# Patient Record
Sex: FEMALE | Race: WHITE | Employment: FULL TIME | URBAN - NONMETROPOLITAN AREA
[De-identification: names, ages, dates, MRNs, and addresses within clinical notes are randomized per-mention and may not be internally consistent; named-entity substitution may affect disease eponyms.]

---

## 2017-05-12 ENCOUNTER — OFFICE VISIT (OUTPATIENT)
Dept: FAMILY MEDICINE | Facility: CLINIC | Age: 37
End: 2017-05-12
Payer: COMMERCIAL

## 2017-05-12 VITALS
SYSTOLIC BLOOD PRESSURE: 132 MMHG | OXYGEN SATURATION: 99 % | HEART RATE: 80 BPM | TEMPERATURE: 97.3 F | DIASTOLIC BLOOD PRESSURE: 72 MMHG

## 2017-05-12 DIAGNOSIS — M71.9 DISORDER OF BURSAE AND TENDONS IN SHOULDER REGION: Primary | ICD-10-CM

## 2017-05-12 DIAGNOSIS — M67.919 DISORDER OF BURSAE AND TENDONS IN SHOULDER REGION: Primary | ICD-10-CM

## 2017-05-12 PROCEDURE — 99213 OFFICE O/P EST LOW 20 MIN: CPT | Mod: 25 | Performed by: NURSE PRACTITIONER

## 2017-05-12 PROCEDURE — 20610 DRAIN/INJ JOINT/BURSA W/O US: CPT | Mod: RT | Performed by: NURSE PRACTITIONER

## 2017-05-12 RX ORDER — TRIAMCINOLONE ACETONIDE 40 MG/ML
40 INJECTION, SUSPENSION INTRA-ARTICULAR; INTRAMUSCULAR ONCE
Qty: 1 ML | Refills: 0 | OUTPATIENT
Start: 2017-05-12 | End: 2017-05-12

## 2017-05-12 RX ORDER — LIDOCAINE HYDROCHLORIDE 10 MG/ML
3 INJECTION, SOLUTION INFILTRATION; PERINEURAL ONCE
Qty: 3 ML | Refills: 0 | OUTPATIENT
Start: 2017-05-12 | End: 2017-05-12

## 2017-05-12 NOTE — PATIENT INSTRUCTIONS
Shoulder Impingement Syndrome  The rotator cuff is a group of muscles and tendons that surround the shoulder joint. These muscles and tendons hold the arm in its joint. They help the shoulder turn. The rotator cuff muscles and tendons can become irritated from repeated rubbing against the shoulder bone. This is called shoulder impingement syndrome or rotator cuff tendonitis.    If your case is mild, you may only need to rest the shoulder and then do certain exercises to strengthen the muscles. You can also take anti-inflammatory medicines. Steroid injections into the shoulder can ease inflammation. But you can have only a limited number of these. If the condition gets worse, your shoulder muscles may become thin and weak. This can lead to a rotator cuff tear.  Symptoms of shoulder impingement syndrome may include:    Shoulder pain that gets worse when you raise your arm overhead    Weakness of the shoulder muscles when you use your arm overhead    Popping and clicking when you move your shoulder    Shoulder pain that wakes you up at night when you sleep on the affected shoulder    Sudden pain in your shoulder when you lift or reach up  Home Care  Follow these tips to take care of yourself at home:    Avoid activities that make your pain worse. These include raising your arms overhead, repeating the same motion over and over, or lifting heavy objects.    Don t hold your arm in one position for a long time. Keep it moving.    Put an ice pack on the sore area for 20 minutes every 1 to 2 hours for the first day. You can make an ice pack by putting ice cubes in a plastic bag. Wrap the bag in a towel before putting it on your shoulder. You should use the ice packs 3 to 4 times a day for the next 2 days. Continue using the ice to relieve of pain and swelling.    You may take acetaminophen or ibuprofen to control pain, unless another medicine was prescribed. If prednisone was prescribed, don t take ibuprofen-type  medicines. If you have chronic liver or kidney disease or ever had a stomach ulcer or GI bleeding, talk with your doctor before using these medicines.    After your symptoms ease, you may get physical therapy or start a home exercise program. This can strengthen your shoulder muscles and help your range of motion. Talk with your doctor about what is best for your condition.  Follow-up care  Follow up with your doctor.  When to seek medical care  Get prompt medical attention if any of these occur:    Shoulder pain that gets worse and wakes you up at night    Your shoulder or arm swells    Numbness, tingling, or pain that travels down the arm to the hand    Loss of shoulder strength       4961-9254 [employer]. [address] [place] [address], [place] [address]. All rights reserved. This information is not intended as a substitute for professional medical care. Always follow your healthcare professional's instructions.

## 2017-05-12 NOTE — PROGRESS NOTES
SUBJECTIVE:                                                    Shweta Pike is a 36 year old female who presents to clinic today for the following health issues:      Musculoskeletal problem/pain      Duration: months     Description  Location: right shoulder > 10 yrs    Intensity:  moderate    Accompanying signs and symptoms: none    History  Previous similar problem: YES- injection in the past   Previous evaluation:  x-ray and MRI    Precipitating or alleviating factors:  Trauma or overuse: no   Aggravating factors include: overuse    Therapies tried and outcome: rest/inactivity and stretching    Previously seen by orthopedic specialist. Previously seen by orthopedic surgeon. No surgical intervention indicated at this time per patient report.    Previous MRI and x-ray are reviewed. See chart.     Results for orders placed or performed in visit on 08/15/16   XR Shoulder Right G/E 3 Views    Narrative    RIGHT SHOULDER 4 VIEWS  8/15/2016 11:31 AM    HISTORY:  Right shoulder pain.    COMPARISON:  3/13/2014    FINDINGS:  No fracture or osseous lesion is seen. The joint spaces are  well preserved. No adjacent soft tissue pathology is seen.      Impression    IMPRESSION:  Unremarkable examination. I see no definite change since  the previous examination.     LITA BROCK MD         -------------------------------------    Problem list and histories reviewed & adjusted, as indicated.  Additional history: as documented    BP Readings from Last 3 Encounters:   05/12/17 132/72   08/15/16 114/83   07/26/16 98/60    Wt Readings from Last 3 Encounters:   08/15/16 155 lb (70.3 kg)   07/26/16 155 lb (70.3 kg)   06/21/16 153 lb (69.4 kg)                  Labs reviewed in EPIC    Reviewed and updated as needed this visit by clinical staff  Allergies       Reviewed and updated as needed this visit by Provider         ROS:  C: NEGATIVE for fever, chills, change in weight  E/M: NEGATIVE for ear, mouth and throat  problems  R: NEGATIVE for significant cough or SOB  CV: NEGATIVE for chest pain, palpitations or peripheral edema  MUSCULOSKELETAL: right shoulder pain  ROS otherwise negative    OBJECTIVE:                                                    /72  Pulse 80  Temp 97.3  F (36.3  C) (Tympanic)  SpO2 99%  There is no height or weight on file to calculate BMI.   GENERAL: healthy, alert, well nourished, well hydrated, no distress  HENT: ear canals- normal; TMs- normal; Nose- normal; Mouth- no ulcers, no lesions  NECK: no tenderness, no adenopathy, no asymmetry, no masses, no stiffness; thyroid- normal to palpation  RESP: lungs clear to auscultation - no rales, no rhonchi, no wheezes  CV: regular rates and rhythm, normal S1 S2, no S3 or S4 and no murmur, no click or rub -  ABDOMEN: soft, no tenderness, no  hepatosplenomegaly, no masses, normal bowel sounds  MUSC: limited range of motion to the right shoulder due to pain. Pain with palpation over the anterior and posterior shoulder. Grasps are equal and strong. CMS to the right hand intact.Pulses are 2+.  SKIN: no suspicious lesions, no rashes    Diagnostic test results:  Results for orders placed or performed in visit on 08/15/16   XR Shoulder Right G/E 3 Views    Narrative    RIGHT SHOULDER 4 VIEWS  8/15/2016 11:31 AM    HISTORY:  Right shoulder pain.    COMPARISON:  3/13/2014    FINDINGS:  No fracture or osseous lesion is seen. The joint spaces are  well preserved. No adjacent soft tissue pathology is seen.      Impression    IMPRESSION:  Unremarkable examination. I see no definite change since  the previous examination.     LITA BROCK MD        ASSESSMENT/PLAN:                                                    1. Disorder of bursae and tendons in shoulder region  fter verbal consent was obtained. Using sterile technique.  A steroid injection was performed right posterior shoulder using 1% plain Lidocaine and 40 mg of Kenalog. This was well  tolerated.  Rest, ice, compress, elevate as needed for pain.      Follow up with Provider -   Call or return to the clinic with any worsening of symptoms or no resolution. Patient/Parent verbalized understanding and is in agreement. Medication side effects reviewed.   Current Outpatient Prescriptions   Medication Sig Dispense Refill     IBUPROFEN PO Take 800 mg by mouth as needed for moderate pain       traZODone (DESYREL) 100 MG tablet Take 2 tablets (200 mg) by mouth At Bedtime 180 tablet 1       See Patient Instructions    ORQUIDEA Aleman [facility]

## 2017-05-12 NOTE — MR AVS SNAPSHOT
After Visit Summary   5/12/2017    Shweta Pike    MRN: [id number]           Patient Information     Date Of Birth          1980        Visit Information        Provider Department      5/12/2017 10:00 AM Nahed Olson APRN [facility]        Care Instructions      Shoulder Impingement Syndrome  The rotator cuff is a group of muscles and tendons that surround the shoulder joint. These muscles and tendons hold the arm in its joint. They help the shoulder turn. The rotator cuff muscles and tendons can become irritated from repeated rubbing against the shoulder bone. This is called shoulder impingement syndrome or rotator cuff tendonitis.    If your case is mild, you may only need to rest the shoulder and then do certain exercises to strengthen the muscles. You can also take anti-inflammatory medicines. Steroid injections into the shoulder can ease inflammation. But you can have only a limited number of these. If the condition gets worse, your shoulder muscles may become thin and weak. This can lead to a rotator cuff tear.  Symptoms of shoulder impingement syndrome may include:    Shoulder pain that gets worse when you raise your arm overhead    Weakness of the shoulder muscles when you use your arm overhead    Popping and clicking when you move your shoulder    Shoulder pain that wakes you up at night when you sleep on the affected shoulder    Sudden pain in your shoulder when you lift or reach up  Home Care  Follow these tips to take care of yourself at home:    Avoid activities that make your pain worse. These include raising your arms overhead, repeating the same motion over and over, or lifting heavy objects.    Don t hold your arm in one position for a long time. Keep it moving.    Put an ice pack on the sore area for 20 minutes every 1 to 2 hours for the first day. You can make an ice pack by putting ice cubes in a plastic bag. Wrap the bag in a towel before  putting it on your shoulder. You should use the ice packs 3 to 4 times a day for the next 2 days. Continue using the ice to relieve of pain and swelling.    You may take acetaminophen or ibuprofen to control pain, unless another medicine was prescribed. If prednisone was prescribed, don t take ibuprofen-type medicines. If you have chronic liver or kidney disease or ever had a stomach ulcer or GI bleeding, talk with your doctor before using these medicines.    After your symptoms ease, you may get physical therapy or start a home exercise program. This can strengthen your shoulder muscles and help your range of motion. Talk with your doctor about what is best for your condition.  Follow-up care  Follow up with your doctor.  When to seek medical care  Get prompt medical attention if any of these occur:    Shoulder pain that gets worse and wakes you up at night    Your shoulder or arm swells    Numbness, tingling, or pain that travels down the arm to the hand    Loss of shoulder strength       5100-2129 [employer]. [address] [place] [address]. All rights reserved. This information is not intended as a substitute for professional medical care. Always follow your healthcare professional's instructions.              Follow-ups after your visit        Who to contact     If you have questions or need follow up information about today's clinic visit or your schedule please contact [facility] directly at [phone].  Normal or non-critical lab and imaging results will be communicated to you by MyChart, letter or phone within 4 business days after the clinic has received the results. If you do not hear from us within 7 days, please contact the clinic through MyChart or phone. If you have a critical or abnormal lab result, we will notify you by phone as soon as possible.  Submit refill requests through [employer] or call your pharmacy and they will forward the refill request to us.  Please allow 3 business days for your refill to be completed.          Additional Information About Your Visit        MyChart Information     [employer] gives you secure access to your electronic health record. If you see a primary care provider, you can also send messages to your care team and make appointments. If you have questions, please call your primary care clinic.  If you do not have a primary care provider, please call [phone] and they will assist you.        Care EveryWhere ID     This is your Care EveryWhere ID. This could be used by other organizations to access your [place] medical records  [id number]        Your Vitals Were     Pulse Temperature Pulse Oximetry             80 97.3  F (36.3  C) (Tympanic) 99%          Blood Pressure from Last 3 Encounters:   05/12/17 132/72   08/15/16 114/83   07/26/16 98/60    Weight from Last 3 Encounters:   08/15/16 155 lb (70.3 kg)   07/26/16 155 lb (70.3 kg)   06/21/16 153 lb (69.4 kg)              Today, you had the following     No orders found for display         Today's Medication Changes          These changes are accurate as of: 5/12/17 10:35 AM.  If you have any questions, ask your nurse or doctor.               Stop taking these medicines if you haven't already. Please contact your care team if you have questions.     HYDROcodone-acetaminophen 5-325 MG per tablet   Commonly known as:  NORCO   Stopped by:  Nahed Olson APRN CNP                    Primary Care Provider Office Phone # Fax #    ORQUIDEA Aleman [phone]       [address] [address]        Thank you!     Thank you for choosing [facility]  for your care. Our goal is always to provide you with excellent care. Hearing back from our patients is one way we can continue to improve our services. Please take a few minutes to complete the written survey that you may receive in the mail after your visit  with us. Thank you!             Your Updated Medication List - Protect others around you: Learn how to safely use, store and throw away your medicines at www.disposemymeds.org.          This list is accurate as of: 5/12/17 10:35 AM.  Always use your most recent med list.                   Brand Name Dispense Instructions for use    ALBUTEROL INHALER 17GM      Inhale  into the lungs as needed. This product no longer available       IBUPROFEN PO      Take 800 mg by mouth as needed for moderate pain       propranolol 20 MG tablet    INDERAL    15 tablet    Take 1 tablet (20 mg) by mouth once as needed Take 60 minutes before testing. Can adjust up to 60 mg if HR and BP tolerating       traZODone 100 MG tablet    DESYREL    180 tablet    Take 2 tablets (200 mg) by mouth At Bedtime

## 2017-05-26 ENCOUNTER — TELEPHONE (OUTPATIENT)
Dept: FAMILY MEDICINE | Facility: CLINIC | Age: 37
End: 2017-05-26

## 2017-05-26 DIAGNOSIS — F41.8 TEST ANXIETY: ICD-10-CM

## 2017-05-26 RX ORDER — PROPRANOLOL HYDROCHLORIDE 20 MG/1
20 TABLET ORAL
Qty: 15 TABLET | Refills: 0 | Status: SHIPPED | OUTPATIENT
Start: 2017-05-26 | End: 2019-03-08

## 2017-05-27 ASSESSMENT — ASTHMA QUESTIONNAIRES: ACT_TOTALSCORE: 25

## 2017-06-08 ENCOUNTER — PRE VISIT (OUTPATIENT)
Dept: ORTHOPEDICS | Facility: CLINIC | Age: 37
End: 2017-06-08

## 2017-06-08 NOTE — TELEPHONE ENCOUNTER
1.  Date/reason for appt: 6/19/17 - Rt Shoulder Disorder  2.  Referring provider: Dr. Quintero  3.  Call to patient (Yes / No - short description): no, pt is referred  4.  Previous care at / records requested from:   - [email or web address] -- Records with Nahed Olson CNP in Epic   - [facility] -- Records are in [facility] -- Office note 8/16/16 with Dr. Etienne Diaz in [facility]   - [facility] in [facility]: XR R Shoulder 8/15/16 and MRI R Shoulder 7/26/16

## 2017-06-19 ENCOUNTER — OFFICE VISIT (OUTPATIENT)
Dept: ORTHOPEDICS | Facility: CLINIC | Age: 37
End: 2017-06-19

## 2017-06-19 VITALS — WEIGHT: 162.2 LBS | BODY MASS INDEX: 27.02 KG/M2 | HEIGHT: 65 IN

## 2017-06-19 DIAGNOSIS — M25.511 CHRONIC RIGHT SHOULDER PAIN: Primary | ICD-10-CM

## 2017-06-19 DIAGNOSIS — G89.29 CHRONIC RIGHT SHOULDER PAIN: Primary | ICD-10-CM

## 2017-06-19 ASSESSMENT — ENCOUNTER SYMPTOMS
CHILLS: 0
POLYPHAGIA: 0
POLYDIPSIA: 0
BACK PAIN: 0
NECK PAIN: 1
MUSCLE CRAMPS: 0
MUSCLE WEAKNESS: 0
WEIGHT LOSS: 0
INCREASED ENERGY: 0
NIGHT SWEATS: 1
MYALGIAS: 0
JOINT SWELLING: 0
FATIGUE: 1
HALLUCINATIONS: 0
FEVER: 0
ALTERED TEMPERATURE REGULATION: 0
ARTHRALGIAS: 1
WEIGHT GAIN: 1
DECREASED APPETITE: 0
STIFFNESS: 0

## 2017-06-19 NOTE — LETTER
"6/19/2017      RE: Shweta Pike  [address] [place] [address]       CHIEF COMPLAINT:  Right shoulder pain.      HISTORY OF PRESENT ILLNESS:  Ms. Pike is a very pleasant 37-year-old left-hand dominant woman (does sports right-handed) with a history of long-term right shoulder pain.  She played softball and volleyball when she was younger and her shoulder has been a problem for around 15 years.  Over the last 2 years, it has been worse.  She did physical therapy a few years ago but does not continue with the home exercises.  On 8/16/2016, she saw Dr. Diaz who did a glenohumeral joint injection.  More recently, she saw ORQUIDEA Diallo, who did an injection on 5/12/2017.  This was described in Ms. Olson's note as a \"posterior shoulder injection\".  This gave Ms. Pike no help even for a couple of hours.      Now, she is painful with simple dressing and ADLs.  She does not describe anything that helps except for ibuprofen.      PAST MEDICAL HISTORY:  None.      PAST SURGICAL HISTORY:  Hysterectomy, sinus surgery, submandibular gland removal.      MEDICATIONS:  Ibuprofen, Propranolol and trazodone.      HABITS:  She does not smoke.      SOCIAL HISTORY:  She is a nurse and a CMA.      FAMILY HISTORY:  Has a brother with 3 shoulder surgeries and a mother with 1.      REVIEW OF SYSTEMS:  Reviewed, and addended below.      RADIOGRAPHS:  6/19/2012 and 8/15/2016  show no evidence of fracture, dislocation or abnormal calcific focus.  An MRI scan from 07/26/2016  shows some tendinosis of the lateral rotator cuff, but no evidence of fracture, dislocation or abnormal calcific focus.      PHYSICAL EXAMINATION:  On physical examination today, she is a well-developed woman in obvious distress.  She articulates and communicates well with a normal affect.  Her breathing is nonlabored.  Her extraocular movements are intact.  Her sensation is intact in the axillary, musculocutaneous, median and ulnar nerve distribution.  " She has warm and well perfused hands with palpable radial pulses and normal hair and sweat patterns without evidence of skin breakdown.  She has no swelling or palpable lymphadenopathy in the shoulder and arm region.  She has no AC joint tenderness to palpation.  She has 3/3 biceps tenderness to palpation, a positive Speeds, Yergason and [place]'s that localizes anteriorly and improves with supination and recreates her symptoms.  She has 135 degrees of active forward elevation that goes to 165 passively and 45 degrees of external rotation at the side.  Internal rotation of the back is to L1.  She has a normal liftoff.  She has 5/5 forward elevator and external rotator strength.      ASSESSMENT:  Right shoulder biceps disease.      PLAN:  I had a nice talk with Ms. Pike today.  We talked at length about surgical and nonsurgical treatment of her shoulder.  We talked about biceps disease and tenotomy versus tenodesis.  We talked about the anterior arm cramping and cosmetic deformity that can occur with biceps tenotomy versus recuperation screw site pain.  She is interested in surgical remediation and chose biceps tenotomy.  She stated that a shape change in her arm would not bother her.  I would like to repeat her MRI scan.  No arthrogram is necessary.  We will do this and I will review it.  If there are additional findings that would alter the discussion today, she will return for discussion in clinic.  Otherwise, she can schedule.   I told her there were no guarantees with surgery, she could be no better or even worse, she could get stiff, infected, need further surgery, have injury to the nerves and arteries that power the hand and arm, or reaction to anesthetic with damage to heart, lungs, brain and even death.  She demonstrated a good understanding of this and wished to proceed with surgical scheduling.  I look forward to seeing her back on the date of her arthroscopic biceps tenotomy.     Quincy Miner  MD [place]

## 2017-06-19 NOTE — PROGRESS NOTES
"CHIEF COMPLAINT:  Right shoulder pain.      HISTORY OF PRESENT ILLNESS:  Ms. Pike is a very pleasant 37-year-old left-hand dominant woman (does sports right-handed) with a history of long-term right shoulder pain.  She played softball and volleyball when she was younger and her shoulder has been a problem for around 15 years.  Over the last 2 years, it has been worse.  She did physical therapy a few years ago but does not continue with the home exercises.  On 8/16/2016, she saw Dr. Diaz who did a glenohumeral joint injection.  More recently, she saw ORQUIDEA Diallo, who did an injection on 5/12/2017.  This was described in MsAntonia Olson's note as a \"posterior shoulder injection\".  This gave Ms. Pike no help even for a couple of hours.      Now, she is painful with simple dressing and ADLs.  She does not describe anything that helps except for ibuprofen.      PAST MEDICAL HISTORY:  None.      PAST SURGICAL HISTORY:  Hysterectomy, sinus surgery, submandibular gland removal.      MEDICATIONS:  Ibuprofen, Propranolol and trazodone.      HABITS:  She does not smoke.      SOCIAL HISTORY:  She is a nurse and a CMA.      FAMILY HISTORY:  Has a brother with 3 shoulder surgeries and a mother with 1.      REVIEW OF SYSTEMS:  Reviewed, and addended below.      RADIOGRAPHS:  6/19/2012 and 8/15/2016  show no evidence of fracture, dislocation or abnormal calcific focus.  An MRI scan from 07/26/2016  shows some tendinosis of the lateral rotator cuff, but no evidence of fracture, dislocation or abnormal calcific focus.      PHYSICAL EXAMINATION:  On physical examination today, she is a well-developed woman in obvious distress.  She articulates and communicates well with a normal affect.  Her breathing is nonlabored.  Her extraocular movements are intact.  Her sensation is intact in the axillary, musculocutaneous, median and ulnar nerve distribution.  She has warm and well perfused hands with palpable radial pulses and " normal hair and sweat patterns without evidence of skin breakdown.  She has no swelling or palpable lymphadenopathy in the shoulder and arm region.  She has no AC joint tenderness to palpation.  She has 3/3 biceps tenderness to palpation, a positive Speeds, Yergason and [place]'s that localizes anteriorly and improves with supination and recreates her symptoms.  She has 135 degrees of active forward elevation that goes to 165 passively and 45 degrees of external rotation at the side.  Internal rotation of the back is to L1.  She has a normal liftoff.  She has 5/5 forward elevator and external rotator strength.      ASSESSMENT:  Right shoulder biceps disease.      PLAN:  I had a nice talk with Ms. Pike today.  We talked at length about surgical and nonsurgical treatment of her shoulder.  We talked about biceps disease and tenotomy versus tenodesis.  We talked about the anterior arm cramping and cosmetic deformity that can occur with biceps tenotomy versus recuperation screw site pain.  She is interested in surgical remediation and chose biceps tenotomy.  She stated that a shape change in her arm would not bother her.  I would like to repeat her MRI scan.  No arthrogram is necessary.  We will do this and I will review it.  If there are additional findings that would alter the discussion today, she will return for discussion in clinic.  Otherwise, she can schedule.   I told her there were no guarantees with surgery, she could be no better or even worse, she could get stiff, infected, need further surgery, have injury to the nerves and arteries that power the hand and arm, or reaction to anesthetic with damage to heart, lungs, brain and even death.  She demonstrated a good understanding of this and wished to proceed with surgical scheduling.  I look forward to seeing her back on the date of her arthroscopic biceps tenotomy.       Answers for HPI/ROS submitted by the patient on 6/19/2017   General Symptoms: Yes  Skin  Symptoms: No  HENT Symptoms: No  EYE SYMPTOMS: No  HEART SYMPTOMS: No  LUNG SYMPTOMS: No  INTESTINAL SYMPTOMS: No  URINARY SYMPTOMS: No  GYNECOLOGIC SYMPTOMS: No  BREAST SYMPTOMS: No  SKELETAL SYMPTOMS: Yes  BLOOD SYMPTOMS: No  NERVOUS SYSTEM SYMPTOMS: No  MENTAL HEALTH SYMPTOMS: No  Fever: No  Loss of appetite: No  Weight loss: No  Weight gain: Yes  Fatigue: Yes  Night sweats: Yes  Chills: No  Increased stress: Yes  Excessive hunger: No  Excessive thirst: No  Feeling hot or cold when others believe the temperature is normal: No  Loss of height: No  Post-operative complications: No  Surgical site pain: No  Hallucinations: No  Change in or Loss of Energy: No  Hyperactivity: No  Confusion: No  Back pain: No  Muscle aches: No  Neck pain: Yes  Swollen joints: No  Joint pain: Yes  Bone pain: No  Muscle cramps: No  Muscle weakness: No  Joint stiffness: No  Bone fracture: No

## 2017-06-19 NOTE — NURSING NOTE
Teaching Flowsheet   Relevant Diagnosis: right shoulder pain  Teaching Topic: Right shoulder scope, biceps tendotomy, arthroscopic subacromial decompression    Shweta will have preop MRI done at Dr Flora Regalado to review to verify surgery plan.  Surgery planned for 7/10/17.  Shweta lives in [facility] about 50 minutes from clinic, verbalized understanding that it will be same day surgery, will have help getting home and staying with her postop.    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       Person(s) involved in teaching:   Patient     Motivation Level:  Asks Questions: Yes  Eager to Learn: Yes  Cooperative: Yes  Receptive (willing/able to accept information): Yes  Any cultural factors/Muslim beliefs that may influence understanding or compliance? No  Comments:      Patient demonstrates understanding of the following:  Reason for the appointment, diagnosis and treatment plan: Yes  Knowledge of proper use of medications and conditions for which they are ordered (with special attention to potential side effects or drug interactions): Yes  Which situations necessitate calling provider and whom to contact: Yes       Teaching Concerns Addressed:   Comments:      Proper use and care of sling (medical equip, care aids, etc.): Yes  Nutritional needs and diet plan: Yes  Pain management techniques: Yes  Wound Care: Yes  How and/when to access community resources: NA     Instructional Materials Used/Given: preop pkt, shoulder scope handout, antiseptic soap.     Time spent with patient: 15 minutes.

## 2017-06-19 NOTE — NURSING NOTE
"Reason For Visit:   Chief Complaint   Patient presents with     Consult     Rt shoulder pain. States she is wondering if it is a torn rotator cuff, hears clicking and poping.  Had a cortisone injection in May with no relief.  Referred dr Nahed Olson       PCP: Nahed Olson  Ref: Same    ?  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  Date of injury: No specific injury  Type of injury: Overuse of shoulder from playing softball and volleyball   Date of surgery: None  Smoker: No      Left  hand dominant    SANE score  Affected shoulder: Right   Right shoulder SANE: 40  Left shoulder SANE: 90    Dynamometer  Forward Elevation:  R = 14 pounds,  L = 24 pounds  External Rotation:   R = 17 pounds,  L = 22 pounds    Ht 1.64 m (5' 4.57\")  Wt 73.6 kg (162 lb 3.2 oz)  BMI 27.35 kg/m2      Pain Assessment  Patient Currently in Pain: Yes  0-10 Pain Scale: 4  Primary Pain Location: Shoulder  Pain Orientation: Right  Pain Descriptors: Constant, Aching  Alleviating Factors: Rest  Aggravating Factors: Exercise, Reaching, Stretching, Movement          "

## 2017-06-19 NOTE — MR AVS SNAPSHOT
After Visit Summary   6/19/2017    Shweta Pike    MRN: [id number]           Patient Information     Date Of Birth          1980        Visit Information        Provider Department      6/19/2017 1:30 PM Quincy Hines MD [facility]        Today's Diagnoses     Chronic right shoulder pain    -  1       Follow-ups after your visit        Your next 10 appointments already scheduled     Jul 06, 2017  1:00 PM CDT   SHORT with ORQUIDEA Aleman CNP   [facility] ([facility])    [address] [place] [id number] [phone]            Jul 17, 2017   Procedure with Quincy Hines MD   [facility] ([facility])    [address] [place] [id number] [phone]           Located in the Clinics and Surgery Center at [address] [place] [address].   parking is very convenient and highly recommended.  is a $6 flat rate fee.  Both  and self parkers should enter the main arrival plaza from [facility]; parking attendants will direct you based on your parking preference.            Jul 31, 2017 11:20 AM CDT   (Arrive by 11:05 AM)   RETURN SHOULDER with Quincy Hines MD   [facility] ([facility])    [address] [place] [id number] [phone]            Aug 28, 2017 10:50 AM CDT   (Arrive by 10:35 AM)   RETURN SHOULDER with Quincy Hines MD   [facility] ([facility])    [address] [place] [id number] [phone]              Who to contact     Please call your clinic at [phone] to:    Ask questions about your health    Make or cancel appointments    Discuss your medicines    Learn about your test results    Speak to your doctor   If you have compliments or  "concerns about an experience at your clinic, or if you wish to file a complaint, please contact [facility] Patient Relations at [phone] or email us at [email or web address]         Additional Information About Your Visit        MyChart Information     [employer] gives you secure access to your electronic health record. If you see a primary care provider, you can also send messages to your care team and make appointments. If you have questions, please call your primary care clinic.  If you do not have a primary care provider, please call [phone] and they will assist you.      PublishThis is an electronic gateway that provides easy, online access to your medical records. With PublishThis, you can request a clinic appointment, read your test results, renew a prescription or communicate with your care team.     To access your existing account, please contact your [facility] or call [phone] for assistance.        Care EveryWhere ID     This is your Care EveryWhere ID. This could be used by other organizations to access your [facility] medical records  [id number]        Your Vitals Were     Height BMI (Body Mass Index)                1.64 m (5' 4.57\") 27.35 kg/m2           Blood Pressure from Last 3 Encounters:   05/12/17 132/72   08/15/16 114/83   07/26/16 98/60    Weight from Last 3 Encounters:   06/19/17 73.6 kg (162 lb 3.2 oz)   08/15/16 70.3 kg (155 lb)   07/26/16 70.3 kg (155 lb)               Primary Care Provider Office Phone # Fax #    ORQUIDEA Aleman [facility] [phone]       [facility] [address] [place] [id number]        Equal Access to Services     YOVANNY ALEJANDRO : Hadii aad eugenia hadlynseyo Soomaali, waaxda luqadaha, qaybta kaalmada adeegyada, kaden escobedo. So [facility] [phone].    ATENCIÓN: Si habla español, tiene a zamudio disposición servicios gratuitos de asistencia lingüística. " Dianna quijano [phone].    We comply with applicable federal civil rights laws and [place] laws. We do not discriminate on the basis of race, color, national origin, age, disability sex, sexual orientation or gender identity.            Thank you!     Thank you for choosing [facility]  for your care. Our goal is always to provide you with excellent care. Hearing back from our patients is one way we can continue to improve our services. Please take a few minutes to complete the written survey that you may receive in the mail after your visit with us. Thank you!             Your Updated Medication List - Protect others around you: Learn how to safely use, store and throw away your medicines at www.disposemymeds.org.          This list is accurate as of: 6/19/17 11:59 PM.  Always use your most recent med list.                   Brand Name Dispense Instructions for use Diagnosis    IBUPROFEN PO      Take 800 mg by mouth as needed for moderate pain        propranolol 20 MG tablet    INDERAL    15 tablet    Take 1 tablet (20 mg) by mouth once as needed Take 60 minutes before testing. Can adjust up to 60 mg if HR and BP tolerating    Test anxiety       traZODone 100 MG tablet    DESYREL    180 tablet    Take 2 tablets (200 mg) by mouth At Bedtime    Persistent insomnia

## 2017-06-19 NOTE — LETTER
"6/19/2017       RE: Shweta Pike  [address] [facility] [address]     Dear Colleague,    Thank you for referring your patient, Shweta Pike, to the [facility] at [facility]. Please see a copy of my visit note below.    CHIEF COMPLAINT:  Right shoulder pain.      HISTORY OF PRESENT ILLNESS:  Ms. Pike is a very pleasant 37-year-old left-hand dominant woman (does sports right-handed) with a history of long-term right shoulder pain.  She played softball and volleyball when she was younger and her shoulder has been a problem for around 15 years.  Over the last 2 years, it has been worse.  She did physical therapy a few years ago but does not continue with the home exercises.  On 8/16/2016, she saw Dr. Diaz who did a glenohumeral joint injection.  More recently, she saw ORQUIDEA Diallo, who did an injection on 5/12/2017.  This was described in Ms. Olson's note as a \"posterior shoulder injection\".  This gave Ms. Pike no help even for a couple of hours.      Now, she is painful with simple dressing and ADLs.  She does not describe anything that helps except for ibuprofen.      PAST MEDICAL HISTORY:  None.      PAST SURGICAL HISTORY:  Hysterectomy, sinus surgery, submandibular gland removal.      MEDICATIONS:  Ibuprofen, Propranolol and trazodone.      HABITS:  She does not smoke.      SOCIAL HISTORY:  She is a nurse and a CMA.      FAMILY HISTORY:  Has a brother with 3 shoulder surgeries and a mother with 1.      REVIEW OF SYSTEMS:  Reviewed, and addended below.      RADIOGRAPHS:  6/19/2012 and 8/15/2016  show no evidence of fracture, dislocation or abnormal calcific focus.  An MRI scan from 07/26/2016  shows some tendinosis of the lateral rotator cuff, but no evidence of fracture, dislocation or abnormal calcific focus.      PHYSICAL EXAMINATION:  On physical examination today, she is a well-developed woman in obvious distress.  She articulates and " communicates well with a normal affect.  Her breathing is nonlabored.  Her extraocular movements are intact.  Her sensation is intact in the axillary, musculocutaneous, median and ulnar nerve distribution.  She has warm and well perfused hands with palpable radial pulses and normal hair and sweat patterns without evidence of skin breakdown.  She has no swelling or palpable lymphadenopathy in the shoulder and arm region.  She has no AC joint tenderness to palpation.  She has 3/3 biceps tenderness to palpation, a positive Speeds, Yergason and Arnett's that localizes anteriorly and improves with supination and recreates her symptoms.  She has 135 degrees of active forward elevation that goes to 165 passively and 45 degrees of external rotation at the side.  Internal rotation of the back is to L1.  She has a normal liftoff.  She has 5/5 forward elevator and external rotator strength.      ASSESSMENT:  Right shoulder biceps disease.      PLAN:  I had a nice talk with Ms. Pike today.  We talked at length about surgical and nonsurgical treatment of her shoulder.  We talked about biceps disease and tenotomy versus tenodesis.  We talked about the anterior arm cramping and cosmetic deformity that can occur with biceps tenotomy versus recuperation screw site pain.  She is interested in surgical remediation and chose biceps tenotomy.  She stated that a shape change in her arm would not bother her.  I would like to repeat her MRI scan.  No arthrogram is necessary.  We will do this and I will review it.  If there are additional findings that would alter the discussion today, she will return for discussion in clinic.  Otherwise, she can schedule.   I told her there were no guarantees with surgery, she could be no better or even worse, she could get stiff, infected, need further surgery, have injury to the nerves and arteries that power the hand and arm, or reaction to anesthetic with damage to heart, lungs, brain and even  death.  She demonstrated a good understanding of this and wished to proceed with surgical scheduling.  I look forward to seeing her back on the date of her arthroscopic biceps tenotomy.     Quincy Hines MD

## 2017-06-21 ENCOUNTER — HOSPITAL ENCOUNTER (OUTPATIENT)
Dept: MRI IMAGING | Facility: CLINIC | Age: 37
Discharge: HOME | End: 2017-06-21
Attending: ORTHOPAEDIC SURGERY | Admitting: ORTHOPAEDIC SURGERY
Payer: COMMERCIAL

## 2017-06-21 DIAGNOSIS — M25.511 CHRONIC RIGHT SHOULDER PAIN: ICD-10-CM

## 2017-06-21 DIAGNOSIS — G89.29 CHRONIC RIGHT SHOULDER PAIN: ICD-10-CM

## 2017-06-21 PROCEDURE — 73221 MRI JOINT UPR EXTREM W/O DYE: CPT | Mod: RT

## 2017-07-06 ENCOUNTER — OFFICE VISIT (OUTPATIENT)
Dept: FAMILY MEDICINE | Facility: CLINIC | Age: 37
End: 2017-07-06
Payer: COMMERCIAL

## 2017-07-06 VITALS
DIASTOLIC BLOOD PRESSURE: 80 MMHG | WEIGHT: 160 LBS | OXYGEN SATURATION: 99 % | BODY MASS INDEX: 26.98 KG/M2 | SYSTOLIC BLOOD PRESSURE: 126 MMHG | TEMPERATURE: 97.3 F | HEART RATE: 100 BPM

## 2017-07-06 DIAGNOSIS — M77.8 TENDONITIS OF SHOULDER, RIGHT: ICD-10-CM

## 2017-07-06 DIAGNOSIS — Z01.818 PREOP GENERAL PHYSICAL EXAM: Primary | ICD-10-CM

## 2017-07-06 DIAGNOSIS — S46.911A RIGHT SHOULDER STRAIN, INITIAL ENCOUNTER: ICD-10-CM

## 2017-07-06 LAB
ANION GAP SERPL CALCULATED.3IONS-SCNC: 8 MMOL/L (ref 3–14)
BASOPHILS # BLD AUTO: 0 10E9/L (ref 0–0.2)
BASOPHILS NFR BLD AUTO: 0.2 %
BUN SERPL-MCNC: 10 MG/DL (ref 7–30)
CALCIUM SERPL-MCNC: 8.6 MG/DL (ref 8.5–10.1)
CHLORIDE SERPL-SCNC: 107 MMOL/L (ref 94–109)
CO2 SERPL-SCNC: 22 MMOL/L (ref 20–32)
CREAT SERPL-MCNC: 0.84 MG/DL (ref 0.52–1.04)
DIFFERENTIAL METHOD BLD: NORMAL
EOSINOPHIL # BLD AUTO: 0.1 10E9/L (ref 0–0.7)
EOSINOPHIL NFR BLD AUTO: 1.6 %
ERYTHROCYTE [DISTWIDTH] IN BLOOD BY AUTOMATED COUNT: 12.3 % (ref 10–15)
GFR SERPL CREATININE-BSD FRML MDRD: 77 ML/MIN/1.7M2
GLUCOSE SERPL-MCNC: 110 MG/DL (ref 70–99)
HCT VFR BLD AUTO: 40.6 % (ref 35–47)
HGB BLD-MCNC: 14.2 G/DL (ref 11.7–15.7)
INR PPP: 0.94 (ref 0.86–1.14)
LYMPHOCYTES # BLD AUTO: 2.9 10E9/L (ref 0.8–5.3)
LYMPHOCYTES NFR BLD AUTO: 32.7 %
MCH RBC QN AUTO: 31.8 PG (ref 26.5–33)
MCHC RBC AUTO-ENTMCNC: 35 G/DL (ref 31.5–36.5)
MCV RBC AUTO: 91 FL (ref 78–100)
MONOCYTES # BLD AUTO: 0.6 10E9/L (ref 0–1.3)
MONOCYTES NFR BLD AUTO: 6.3 %
NEUTROPHILS # BLD AUTO: 5.2 10E9/L (ref 1.6–8.3)
NEUTROPHILS NFR BLD AUTO: 59.2 %
PLATELET # BLD AUTO: 318 10E9/L (ref 150–450)
POTASSIUM SERPL-SCNC: 3.8 MMOL/L (ref 3.4–5.3)
RBC # BLD AUTO: 4.46 10E12/L (ref 3.8–5.2)
SODIUM SERPL-SCNC: 137 MMOL/L (ref 133–144)
WBC # BLD AUTO: 8.8 10E9/L (ref 4–11)

## 2017-07-06 PROCEDURE — 85025 COMPLETE CBC W/AUTO DIFF WBC: CPT | Performed by: NURSE PRACTITIONER

## 2017-07-06 PROCEDURE — 99214 OFFICE O/P EST MOD 30 MIN: CPT | Performed by: NURSE PRACTITIONER

## 2017-07-06 PROCEDURE — 36415 COLL VENOUS BLD VENIPUNCTURE: CPT | Performed by: NURSE PRACTITIONER

## 2017-07-06 PROCEDURE — 80048 BASIC METABOLIC PNL TOTAL CA: CPT | Performed by: NURSE PRACTITIONER

## 2017-07-06 PROCEDURE — 85610 PROTHROMBIN TIME: CPT | Performed by: NURSE PRACTITIONER

## 2017-07-06 RX ORDER — HYDROCODONE BITARTRATE AND ACETAMINOPHEN 5; 325 MG/1; MG/1
1-2 TABLET ORAL EVERY 4 HOURS PRN
Qty: 30 TABLET | Refills: 0 | Status: SHIPPED | OUTPATIENT
Start: 2017-07-06 | End: 2017-07-17

## 2017-07-06 NOTE — PROGRESS NOTES
[facility]  [address] [facility] [address]  [phone]  Dept: [phone]    PRE-OP EVALUATION:  Today's date: 2017    Shweta Pike (: 1980) presents for pre-operative evaluation assessment as requested by Dr. Roy.  She requires evaluation and anesthesia risk assessment prior to undergoing surgery/procedure for treatment of right shoulder .  Proposed procedure:Right Biceps Tenotomy Arthroscopic Subacromial Decompression     Date of Surgery/ Procedure: 2017  Time of Surgery/ Procedure: [facility]  [facility]    Fax number for surgical facility: [phone]  Primary Physician: Nahed Olson  Type of Anesthesia Anticipated: to be determined    Patient has a Health Care Directive or Living Will:  NO    1. NO - Do you have a history of heart attack, stroke, stent, bypass or surgery on an artery in the head, neck, heart or legs?  2. NO - Do you ever have any pain or discomfort in your chest?  3. NO - Do you have a history of  Heart Failure?  4. NO - Are you troubled by shortness of breath when: walking on the level, up a slight hill or at night?  5. NO - Do you currently have a cold, bronchitis or other respiratory infection?  6. NO - Do you have a cough, shortness of breath or wheezing?  7. NO - Do you sometimes get pains in the calves of your legs when you walk?  8. NO - Do you or anyone in your family have previous history of blood clots?  9. NO - Do you or does anyone in your family have a serious bleeding problem such as prolonged bleeding following surgeries or cuts?  10. NO - Have you ever had problems with anemia or been told to take iron pills?  11. NO - Have you had any abnormal blood loss such as black, tarry or bloody stools, or abnormal vaginal bleeding?  12. NO - Have you ever had a blood transfusion?  13. NO - Have you or any of your relatives ever had problems with anesthesia?  14. NO - Do you have sleep apnea, excessive snoring  or daytime drowsiness?  15. NO - Do you have any prosthetic heart valves?  16. NO - Do you have prosthetic joints?  17. NO - Is there any chance that you may be pregnant?      HPI:                                                      Brief HPI related to upcoming procedure: Right shoulder pain > 10 years.  Worse in the past 2 yrs. Constant pain in the middle of the night.         See problem list for active medical problems.  Problems all longstanding and stable, except as noted/documented.  See ROS for pertinent symptoms related to these conditions.                                                                                                  .    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     Priority: Medium     ENT removal recommended       Left shoulder pain 03/10/2016     Priority: Medium     Insomnia, unspecified insomnia 11/02/2015     Priority: Medium     Exercise-induced asthma 11/02/2015     Priority: Medium     Test anxiety 01/09/2015     Priority: Medium     Prepatellar bursitis of right knee 07/18/2014     Priority: Medium     Right shoulder strain 03/13/2014     Priority: Medium      Past Medical History:   Diagnosis Date     Generalized headaches      Past Surgical History:   Procedure Laterality Date     EXCISE GLAND SUBMANDIBULAR Left 5/23/2016    Procedure: EXCISE GLAND SUBMANDIBULAR;  Surgeon: Jass Vasquez MD;  Location: [facility]     HYSTERECTOMY, MELISSA  3/12/2013    left ovary remains      SINUS SURGERY  2003     Current Outpatient Prescriptions   Medication Sig Dispense Refill     HYDROcodone-acetaminophen (NORCO) 5-325 MG per tablet Take 1-2 tablets by mouth every 4 hours as needed for other (Moderate to Severe Pain) 30 tablet 0     propranolol (INDERAL) 20 MG tablet Take 1 tablet (20 mg) by mouth once as needed Take 60 minutes before testing. Can adjust up to 60  mg if HR and BP tolerating 15 tablet 0     IBUPROFEN PO Take 800 mg by mouth as needed for moderate pain       traZODone (DESYREL) 100 MG tablet Take 2 tablets (200 mg) by mouth At Bedtime 180 tablet 1     OTC products: None, except as noted above    Allergies   Allergen Reactions     Amoxicillin      Penicillin G      Sulfa Drugs      Zithromax [Azithromycin Dihydrate]       Latex Allergy: NO    Social History   Substance Use Topics     Smoking status: Never Smoker     Smokeless tobacco: Never Used     Alcohol use Yes      Comment: occasionally     History   Drug Use No       REVIEW OF SYSTEMS:                                                    Constitutional, neuro, ENT, endocrine, pulmonary, cardiac, gastrointestinal, genitourinary, musculoskeletal, integument and psychiatric systems are negative, except as otherwise noted.    EXAM:                                                    /80  Pulse 100  Temp 97.3  F (36.3  C) (Tympanic)  Wt 160 lb (72.6 kg)  SpO2 99%  BMI 26.98 kg/m2    GENERAL APPEARANCE: healthy, alert and no distress     EYES: EOMI, PERRL     HENT: ear canals and TM's normal and nose and mouth without ulcers or lesions     NECK: no adenopathy, no asymmetry, masses, or scars and thyroid normal to palpation     RESP: lungs clear to auscultation - no rales, rhonchi or wheezes     CV: regular rates and rhythm, normal S1 S2, no S3 or S4 and no murmur, click or rub     ABDOMEN:  soft, nontender, no HSM or masses and bowel sounds normal     MS: extremities normal- no gross deformities noted, no evidence of inflammation in joints, range of motion right shoulder limited by pain.   CMS to hands intact. Radial pulse + bilaterally. .     SKIN: no suspicious lesions or rashes     NEURO: Normal strength and tone, sensory exam grossly normal, mentation intact and speech normal     PSYCH: mentation appears normal. and affect normal/bright     LYMPHATICS: No axillary, cervical, or supraclavicular  nodes    DIAGNOSTICS:                                                      Results for orders placed or performed in visit on 07/06/17   CBC with platelets differential   Result Value Ref Range    WBC 8.8 4.0 - 11.0 10e9/L    RBC Count 4.46 3.8 - 5.2 10e12/L    Hemoglobin 14.2 11.7 - 15.7 g/dL    Hematocrit 40.6 35.0 - 47.0 %    MCV 91 78 - 100 fl    MCH 31.8 26.5 - 33.0 pg    MCHC 35.0 31.5 - 36.5 g/dL    RDW 12.3 10.0 - 15.0 %    Platelet Count 318 150 - 450 10e9/L    Diff Method Automated Method     % Neutrophils 59.2 %    % Lymphocytes 32.7 %    % Monocytes 6.3 %    % Eosinophils 1.6 %    % Basophils 0.2 %    Absolute Neutrophil 5.2 1.6 - 8.3 10e9/L    Absolute Lymphocytes 2.9 0.8 - 5.3 10e9/L    Absolute Monocytes 0.6 0.0 - 1.3 10e9/L    Absolute Eosinophils 0.1 0.0 - 0.7 10e9/L    Absolute Basophils 0.0 0.0 - 0.2 10e9/L   Basic metabolic panel  (Ca, Cl, CO2, Creat, Gluc, K, Na, BUN)   Result Value Ref Range    Sodium 137 133 - 144 mmol/L    Potassium 3.8 3.4 - 5.3 mmol/L    Chloride 107 94 - 109 mmol/L    Carbon Dioxide 22 20 - 32 mmol/L    Anion Gap 8 3 - 14 mmol/L    Glucose 110 (H) 70 - 99 mg/dL    Urea Nitrogen 10 7 - 30 mg/dL    Creatinine 0.84 0.52 - 1.04 mg/dL    GFR Estimate 77 >60 mL/min/1.7m2    GFR Estimate If Black >90   GFR Calc   >60 mL/min/1.7m2    Calcium 8.6 8.5 - 10.1 mg/dL   INR   Result Value Ref Range    INR 0.94 0.86 - 1.14         Recent Labs   Lab Test  05/17/16   1416  03/10/16   1427   HGB  14.9  13.8   PLT  353  370   NA  139   --    POTASSIUM  4.1   --    CR  0.60   --         IMPRESSION:                                                    Reason for surgery/procedure: RIGHT SHOULDER PAIN   Diagnosis/reason for consult: PRE OP EVALUATION     The proposed surgical procedure is considered INTERMEDIATE risk.    REVISED CARDIAC RISK INDEX  The patient has the following serious cardiovascular risks for perioperative complications such as (MI, PE, VFib and 3  AV Block):  No  serious cardiac risks  INTERPRETATION: 0 risks: Class I (very low risk - 0.4% complication rate)    The patient has the following additional risks for perioperative complications:  No identified additional risks      ICD-10-CM    1. Preop general physical exam Z01.818 CBC with platelets differential     Basic metabolic panel  (Ca, Cl, CO2, Creat, Gluc, K, Na, BUN)     INR   2. Right shoulder strain, initial encounter S46.911A HYDROcodone-acetaminophen (NORCO) 5-325 MG per tablet   3. Tendonitis of shoulder, right M75.81 HYDROcodone-acetaminophen (NORCO) 5-325 MG per tablet       RECOMMENDATIONS:                                                        Pulmonary Risk  Incentive spirometry post op  Respiratory Therapy (Respiratory Care IP Consult)  post op  NG tube decompression if abdominal distension or significant vomiting       --Patient is to take all scheduled medications on the day of surgery EXCEPT for modifications listed below.    Anticoagulant or Antiplatelet Medication Use  NSAIDS: Ibuprofen (Motrin):         Stop one day prior to surgery        APPROVAL GIVEN to proceed with proposed procedure, without further diagnostic evaluation       Signed Electronically by: ORQUIDEA Aleman CNP    Copy of this evaluation report is provided to requesting physician.    [facility] Preop Guidelines

## 2017-07-06 NOTE — NURSING NOTE
"Chief Complaint   Patient presents with     Pre-Op Exam       Initial /80  Pulse 100  Temp 97.3  F (36.3  C) (Tympanic)  Wt 160 lb (72.6 kg)  SpO2 99%  BMI 26.98 kg/m2 Estimated body mass index is 26.98 kg/(m^2) as calculated from the following:    Height as of 6/19/17: 5' 4.57\" (1.64 m).    Weight as of this encounter: 160 lb (72.6 kg).  Medication Reconciliation: complete    Health Maintenance that is potentially due pending provider review:  NONE    n/a    "

## 2017-07-06 NOTE — LETTER
[facility]  [address] [facility] [address]  Phone: [phone]    July 7, 2017    Shweta S Glendy  [address] [facility] [address]          Dear MsAntonia Glendy,    The results of your recent lab tests were within normal limits. Enclosed is a copy of these results.  If you have any further questions or problems, please contact our office.    Results for orders placed or performed in visit on 07/06/17   CBC with platelets differential   Result Value Ref Range    WBC 8.8 4.0 - 11.0 10e9/L    RBC Count 4.46 3.8 - 5.2 10e12/L    Hemoglobin 14.2 11.7 - 15.7 g/dL    Hematocrit 40.6 35.0 - 47.0 %    MCV 91 78 - 100 fl    MCH 31.8 26.5 - 33.0 pg    MCHC 35.0 31.5 - 36.5 g/dL    RDW 12.3 10.0 - 15.0 %    Platelet Count 318 150 - 450 10e9/L    Diff Method Automated Method     % Neutrophils 59.2 %    % Lymphocytes 32.7 %    % Monocytes 6.3 %    % Eosinophils 1.6 %    % Basophils 0.2 %    Absolute Neutrophil 5.2 1.6 - 8.3 10e9/L    Absolute Lymphocytes 2.9 0.8 - 5.3 10e9/L    Absolute Monocytes 0.6 0.0 - 1.3 10e9/L    Absolute Eosinophils 0.1 0.0 - 0.7 10e9/L    Absolute Basophils 0.0 0.0 - 0.2 10e9/L   Basic metabolic panel  (Ca, Cl, CO2, Creat, Gluc, K, Na, BUN)   Result Value Ref Range    Sodium 137 133 - 144 mmol/L    Potassium 3.8 3.4 - 5.3 mmol/L    Chloride 107 94 - 109 mmol/L    Carbon Dioxide 22 20 - 32 mmol/L    Anion Gap 8 3 - 14 mmol/L    Glucose 110 (H) 70 - 99 mg/dL    Urea Nitrogen 10 7 - 30 mg/dL    Creatinine 0.84 0.52 - 1.04 mg/dL    GFR Estimate 77 >60 mL/min/1.7m2    GFR Estimate If Black >90   GFR Calc   >60 mL/min/1.7m2    Calcium 8.6 8.5 - 10.1 mg/dL   INR   Result Value Ref Range    INR 0.94 0.86 - 1.14         Sincerely,      Nahed Olson FNP-BC / sb

## 2017-07-06 NOTE — MR AVS SNAPSHOT
After Visit Summary   7/6/2017    Shweta Pike    MRN: [id number]           Patient Information     Date Of Birth          1980        Visit Information        Provider Department      7/6/2017 1:00 PM Nahed Olson APRN [facility]        Today's Diagnoses     Preop general physical exam    -  1    Right shoulder strain, initial encounter        Tendonitis of shoulder, right          Care Instructions      Before Your Surgery      Call your surgeon if there is any change in your health. This includes signs of a cold or flu (such as a sore throat, runny nose, cough, rash or fever).    Do not smoke, drink alcohol or take over the counter medicine (unless your surgeon or primary care doctor tells you to) for the 24 hours before and after surgery.    If you take prescribed drugs: Follow your doctor s orders about which medicines to take and which to stop until after surgery.    Eating and drinking prior to surgery: follow the instructions from your surgeon    Take a shower or bath the night before surgery. Use the soap your surgeon gave you to gently clean your skin. If you do not have soap from your surgeon, use your regular soap. Do not shave or scrub the surgery site.  Wear clean pajamas and have clean sheets on your bed.           Follow-ups after your visit        Your next 10 appointments already scheduled     Jul 17, 2017   Procedure with Quincy Hines MD   [facility] ([facility])    [address] [place], [place] [id number] [phone]           Located in the Clinics and Surgery Center at [address] [place] [address].   parking is very convenient and highly recommended.  is a $6 flat rate fee.  Both  and self parkers should enter the main arrival plaza from [facility]; parking attendants will direct you based on your parking preference.            Jul  31, 2017 11:20 AM CDT   (Arrive by 11:05 AM)   RETURN SHOULDER with Quincy Hines MD   [facility] ([facility] [place])    [address] [place] [id number] [phone]            Aug 28, 2017 10:50 AM CDT   (Arrive by 10:35 AM)   RETURN SHOULDER with Quincy Hines MD   [facility] ([facility])    [address] [place] [id number] [phone]              Who to contact     If you have questions or need follow up information about today's clinic visit or your schedule please contact [facility] directly at [phone].  Normal or non-critical lab and imaging results will be communicated to you by MyChart, letter or phone within 4 business days after the clinic has received the results. If you do not hear from us within 7 days, please contact the clinic through MyChart or phone. If you have a critical or abnormal lab result, we will notify you by phone as soon as possible.  Submit refill requests through [employer] or call your pharmacy and they will forward the refill request to us. Please allow 3 business days for your refill to be completed.          Additional Information About Your Visit        [employer] Information     [employer] gives you secure access to your electronic health record. If you see a primary care provider, you can also send messages to your care team and make appointments. If you have questions, please call your primary care clinic.  If you do not have a primary care provider, please call [phone] and they will assist you.        Care EveryWhere ID     This is your Care EveryWhere ID. This could be used by other organizations to access your [facility] medical records  [id number]        Your Vitals Were     Pulse Temperature Pulse Oximetry BMI (Body Mass Index)          100 97.3  F (36.3  C) (Tympanic) 99% 26.98 kg/m2         Blood  Pressure from Last 3 Encounters:   07/06/17 126/80   05/12/17 132/72   08/15/16 114/83    Weight from Last 3 Encounters:   07/06/17 160 lb (72.6 kg)   06/19/17 162 lb 3.2 oz (73.6 kg)   08/15/16 155 lb (70.3 kg)              We Performed the Following     Basic metabolic panel  (Ca, Cl, CO2, Creat, Gluc, K, Na, BUN)     CBC with platelets differential     INR          Today's Medication Changes          These changes are accurate as of: 7/6/17  1:24 PM.  If you have any questions, ask your nurse or doctor.               Start taking these medicines.        Dose/Directions    HYDROcodone-acetaminophen 5-325 MG per tablet   Commonly known as:  NORCO   Used for:  Right shoulder strain, initial encounter, Tendonitis of shoulder, right   Started by:  Nahed Olson APRN CNP        Dose:  1-2 tablet   Take 1-2 tablets by mouth every 4 hours as needed for other (Moderate to Severe Pain)   Quantity:  30 tablet   Refills:  0            Where to get your medicines      Some of these will need a paper prescription and others can be bought over the counter.  Ask your nurse if you have questions.     Bring a paper prescription for each of these medications     HYDROcodone-acetaminophen 5-325 MG per tablet                Primary Care Provider Office Phone # Fax #    ORQUIDEA Aleman [phone]       [facility] [address] [place] [id number]        Equal Access to Services     YOVANNY ALEJANDRO AH: Hadii codie carsono Soruiz, waaxda luqadaha, qaybta kaalmada adebliareyajesus, waxay nayeli easton . So [facility] [phone].    ATENCIÓN: Si habla español, tiene a zamudio disposición servicios gratuitos de asistencia lingüística. Llame al [phone].    We comply with applicable federal civil rights laws and [place] laws. We do not discriminate on the basis of race, color, national origin, age, disability sex, sexual orientation or gender identity.            Thank you!      Thank you for choosing [facility]  for your care. Our goal is always to provide you with excellent care. Hearing back from our patients is one way we can continue to improve our services. Please take a few minutes to complete the written survey that you may receive in the mail after your visit with us. Thank you!             Your Updated Medication List - Protect others around you: Learn how to safely use, store and throw away your medicines at www.disposemymeds.org.          This list is accurate as of: 7/6/17  1:24 PM.  Always use your most recent med list.                   Brand Name Dispense Instructions for use Diagnosis    HYDROcodone-acetaminophen 5-325 MG per tablet    NORCO    30 tablet    Take 1-2 tablets by mouth every 4 hours as needed for other (Moderate to Severe Pain)    Right shoulder strain, initial encounter, Tendonitis of shoulder, right       IBUPROFEN PO      Take 800 mg by mouth as needed for moderate pain        propranolol 20 MG tablet    INDERAL    15 tablet    Take 1 tablet (20 mg) by mouth once as needed Take 60 minutes before testing. Can adjust up to 60 mg if HR and BP tolerating    Test anxiety       traZODone 100 MG tablet    DESYREL    180 tablet    Take 2 tablets (200 mg) by mouth At Bedtime    Persistent insomnia

## 2017-07-10 ENCOUNTER — TELEPHONE (OUTPATIENT)
Dept: ORTHOPEDICS | Facility: CLINIC | Age: 37
End: 2017-07-10

## 2017-07-10 NOTE — TELEPHONE ENCOUNTER
Right shoulder MRI reviewed by Dr Hines.  Surgery plan is unchanged (right biceps tenotomy, arthroscopic subacromial decompression 07/17/2017).  Message left on patient's voicemail to call for results.

## 2017-07-14 ENCOUNTER — ANESTHESIA EVENT (OUTPATIENT)
Dept: SURGERY | Facility: AMBULATORY SURGERY CENTER | Age: 37
End: 2017-07-14

## 2017-07-16 DIAGNOSIS — G47.00 PERSISTENT INSOMNIA: ICD-10-CM

## 2017-07-16 NOTE — TELEPHONE ENCOUNTER
traZODone (DESYREL) 100 MG tablet       Last Written Prescription Date: 6/30/16  Last Fill Quantity: 180; # refills: 1  Last Office Visit with [facility], [facility] or [facility] prescribing provider:  7/6/17        Last PHQ-9 score on record= No flowsheet data found.    No results found for: AST  No results found for: ALT

## 2017-07-17 ENCOUNTER — HOSPITAL ENCOUNTER (OUTPATIENT)
Facility: AMBULATORY SURGERY CENTER | Age: 37
End: 2017-07-17
Attending: ORTHOPAEDIC SURGERY

## 2017-07-17 ENCOUNTER — ANESTHESIA (OUTPATIENT)
Dept: SURGERY | Facility: AMBULATORY SURGERY CENTER | Age: 37
End: 2017-07-17

## 2017-07-17 VITALS
OXYGEN SATURATION: 96 % | TEMPERATURE: 97.5 F | HEIGHT: 64 IN | DIASTOLIC BLOOD PRESSURE: 78 MMHG | SYSTOLIC BLOOD PRESSURE: 114 MMHG | RESPIRATION RATE: 12 BRPM | WEIGHT: 160 LBS | BODY MASS INDEX: 27.31 KG/M2 | HEART RATE: 83 BPM

## 2017-07-17 DEVICE — IMP ANCHOR ARTHREX 5.5MM BIOCOMP CRKSCR TIGRTL AR-1927BCFT: Type: IMPLANTABLE DEVICE | Status: FUNCTIONAL

## 2017-07-17 RX ORDER — FENTANYL CITRATE 50 UG/ML
INJECTION, SOLUTION INTRAMUSCULAR; INTRAVENOUS PRN
Status: DISCONTINUED | OUTPATIENT
Start: 2017-07-17 | End: 2017-07-17

## 2017-07-17 RX ORDER — NALOXONE HYDROCHLORIDE 0.4 MG/ML
.1-.4 INJECTION, SOLUTION INTRAMUSCULAR; INTRAVENOUS; SUBCUTANEOUS
Status: DISCONTINUED | OUTPATIENT
Start: 2017-07-17 | End: 2017-07-18 | Stop reason: HOSPADM

## 2017-07-17 RX ORDER — GABAPENTIN 300 MG/1
300 CAPSULE ORAL ONCE
Status: COMPLETED | OUTPATIENT
Start: 2017-07-17 | End: 2017-07-17

## 2017-07-17 RX ORDER — HYDROMORPHONE HYDROCHLORIDE 2 MG/1
2-4 TABLET ORAL EVERY 4 HOURS PRN
Qty: 30 TABLET | Refills: 0 | Status: SHIPPED | OUTPATIENT
Start: 2017-07-17 | End: 2017-07-31

## 2017-07-17 RX ORDER — GABAPENTIN 300 MG/1
300 CAPSULE ORAL ONCE
Status: DISCONTINUED | OUTPATIENT
Start: 2017-07-17 | End: 2017-07-17 | Stop reason: HOSPADM

## 2017-07-17 RX ORDER — DEXAMETHASONE SODIUM PHOSPHATE 4 MG/ML
INJECTION, SOLUTION INTRA-ARTICULAR; INTRALESIONAL; INTRAMUSCULAR; INTRAVENOUS; SOFT TISSUE PRN
Status: DISCONTINUED | OUTPATIENT
Start: 2017-07-17 | End: 2017-07-17

## 2017-07-17 RX ORDER — ONDANSETRON 2 MG/ML
4 INJECTION INTRAMUSCULAR; INTRAVENOUS EVERY 30 MIN PRN
Status: DISCONTINUED | OUTPATIENT
Start: 2017-07-17 | End: 2017-07-18 | Stop reason: HOSPADM

## 2017-07-17 RX ORDER — MEPERIDINE HYDROCHLORIDE 25 MG/ML
12.5 INJECTION INTRAMUSCULAR; INTRAVENOUS; SUBCUTANEOUS
Status: DISCONTINUED | OUTPATIENT
Start: 2017-07-17 | End: 2017-07-18 | Stop reason: HOSPADM

## 2017-07-17 RX ORDER — ACETAMINOPHEN 325 MG/1
975 TABLET ORAL ONCE
Status: DISCONTINUED | OUTPATIENT
Start: 2017-07-17 | End: 2017-07-17 | Stop reason: HOSPADM

## 2017-07-17 RX ORDER — CLINDAMYCIN PHOSPHATE 900 MG/50ML
900 INJECTION, SOLUTION INTRAVENOUS SEE ADMIN INSTRUCTIONS
Status: DISCONTINUED | OUTPATIENT
Start: 2017-07-17 | End: 2017-07-17 | Stop reason: HOSPADM

## 2017-07-17 RX ORDER — FENTANYL CITRATE 50 UG/ML
25-50 INJECTION, SOLUTION INTRAMUSCULAR; INTRAVENOUS
Status: DISCONTINUED | OUTPATIENT
Start: 2017-07-17 | End: 2017-07-17 | Stop reason: HOSPADM

## 2017-07-17 RX ORDER — NALOXONE HYDROCHLORIDE 0.4 MG/ML
.1-.4 INJECTION, SOLUTION INTRAMUSCULAR; INTRAVENOUS; SUBCUTANEOUS
Status: DISCONTINUED | OUTPATIENT
Start: 2017-07-17 | End: 2017-07-17 | Stop reason: HOSPADM

## 2017-07-17 RX ORDER — HYDROXYZINE PAMOATE 25 MG/1
25 CAPSULE ORAL 3 TIMES DAILY PRN
Qty: 30 CAPSULE | Refills: 0 | Status: SHIPPED | OUTPATIENT
Start: 2017-07-17 | End: 2017-07-31

## 2017-07-17 RX ORDER — IBUPROFEN 800 MG/1
800 TABLET, FILM COATED ORAL
Qty: 42 TABLET | Refills: 0 | Status: SHIPPED | OUTPATIENT
Start: 2017-07-17 | End: 2017-08-02

## 2017-07-17 RX ORDER — PROPOFOL 10 MG/ML
INJECTION, EMULSION INTRAVENOUS PRN
Status: DISCONTINUED | OUTPATIENT
Start: 2017-07-17 | End: 2017-07-17

## 2017-07-17 RX ORDER — BUPIVACAINE HYDROCHLORIDE AND EPINEPHRINE 5; 5 MG/ML; UG/ML
INJECTION, SOLUTION PERINEURAL PRN
Status: DISCONTINUED | OUTPATIENT
Start: 2017-07-17 | End: 2017-07-17

## 2017-07-17 RX ORDER — FENTANYL CITRATE 50 UG/ML
25-50 INJECTION, SOLUTION INTRAMUSCULAR; INTRAVENOUS EVERY 5 MIN PRN
Status: DISCONTINUED | OUTPATIENT
Start: 2017-07-17 | End: 2017-07-17 | Stop reason: HOSPADM

## 2017-07-17 RX ORDER — PROPOFOL 10 MG/ML
INJECTION, EMULSION INTRAVENOUS CONTINUOUS PRN
Status: DISCONTINUED | OUTPATIENT
Start: 2017-07-17 | End: 2017-07-17

## 2017-07-17 RX ORDER — SODIUM CHLORIDE, SODIUM LACTATE, POTASSIUM CHLORIDE, CALCIUM CHLORIDE 600; 310; 30; 20 MG/100ML; MG/100ML; MG/100ML; MG/100ML
INJECTION, SOLUTION INTRAVENOUS CONTINUOUS
Status: DISCONTINUED | OUTPATIENT
Start: 2017-07-17 | End: 2017-07-17 | Stop reason: HOSPADM

## 2017-07-17 RX ORDER — GABAPENTIN 300 MG/1
300 CAPSULE ORAL 3 TIMES DAILY
Qty: 30 CAPSULE | Refills: 0 | Status: SHIPPED | OUTPATIENT
Start: 2017-07-17 | End: 2017-07-31

## 2017-07-17 RX ORDER — SODIUM CHLORIDE, SODIUM LACTATE, POTASSIUM CHLORIDE, CALCIUM CHLORIDE 600; 310; 30; 20 MG/100ML; MG/100ML; MG/100ML; MG/100ML
INJECTION, SOLUTION INTRAVENOUS CONTINUOUS
Status: DISCONTINUED | OUTPATIENT
Start: 2017-07-17 | End: 2017-07-18 | Stop reason: HOSPADM

## 2017-07-17 RX ORDER — CLINDAMYCIN PHOSPHATE 900 MG/50ML
900 INJECTION, SOLUTION INTRAVENOUS
Status: DISCONTINUED | OUTPATIENT
Start: 2017-07-17 | End: 2017-07-17 | Stop reason: HOSPADM

## 2017-07-17 RX ORDER — TRAZODONE HYDROCHLORIDE 100 MG/1
TABLET ORAL
Qty: 180 TABLET | Refills: 0 | Status: SHIPPED | OUTPATIENT
Start: 2017-07-17 | End: 2017-08-02

## 2017-07-17 RX ORDER — ONDANSETRON 2 MG/ML
INJECTION INTRAMUSCULAR; INTRAVENOUS PRN
Status: DISCONTINUED | OUTPATIENT
Start: 2017-07-17 | End: 2017-07-17

## 2017-07-17 RX ORDER — ONDANSETRON 4 MG/1
4 TABLET, ORALLY DISINTEGRATING ORAL EVERY 30 MIN PRN
Status: DISCONTINUED | OUTPATIENT
Start: 2017-07-17 | End: 2017-07-18 | Stop reason: HOSPADM

## 2017-07-17 RX ORDER — FLUMAZENIL 0.1 MG/ML
0.2 INJECTION, SOLUTION INTRAVENOUS
Status: DISCONTINUED | OUTPATIENT
Start: 2017-07-17 | End: 2017-07-17 | Stop reason: HOSPADM

## 2017-07-17 RX ORDER — ACETAMINOPHEN 325 MG/1
975 TABLET ORAL ONCE
Status: COMPLETED | OUTPATIENT
Start: 2017-07-17 | End: 2017-07-17

## 2017-07-17 RX ADMIN — BUPIVACAINE HYDROCHLORIDE AND EPINEPHRINE 10 ML: 5; 5 INJECTION, SOLUTION PERINEURAL at 15:10

## 2017-07-17 RX ADMIN — ACETAMINOPHEN 975 MG: 325 TABLET ORAL at 14:41

## 2017-07-17 RX ADMIN — FENTANYL CITRATE 50 MCG: 50 INJECTION, SOLUTION INTRAMUSCULAR; INTRAVENOUS at 15:04

## 2017-07-17 RX ADMIN — SODIUM CHLORIDE, SODIUM LACTATE, POTASSIUM CHLORIDE, CALCIUM CHLORIDE: 600; 310; 30; 20 INJECTION, SOLUTION INTRAVENOUS at 14:40

## 2017-07-17 RX ADMIN — PROPOFOL 160 MG: 10 INJECTION, EMULSION INTRAVENOUS at 15:45

## 2017-07-17 RX ADMIN — PROPOFOL 200 MCG/KG/MIN: 10 INJECTION, EMULSION INTRAVENOUS at 15:45

## 2017-07-17 RX ADMIN — ONDANSETRON 4 MG: 2 INJECTION INTRAMUSCULAR; INTRAVENOUS at 16:50

## 2017-07-17 RX ADMIN — DEXAMETHASONE SODIUM PHOSPHATE 4 MG: 4 INJECTION, SOLUTION INTRA-ARTICULAR; INTRALESIONAL; INTRAMUSCULAR; INTRAVENOUS; SOFT TISSUE at 15:45

## 2017-07-17 RX ADMIN — SODIUM CHLORIDE, SODIUM LACTATE, POTASSIUM CHLORIDE, CALCIUM CHLORIDE: 600; 310; 30; 20 INJECTION, SOLUTION INTRAVENOUS at 16:45

## 2017-07-17 RX ADMIN — FENTANYL CITRATE 25 MCG: 50 INJECTION, SOLUTION INTRAMUSCULAR; INTRAVENOUS at 16:33

## 2017-07-17 RX ADMIN — GABAPENTIN 300 MG: 300 CAPSULE ORAL at 14:41

## 2017-07-17 RX ADMIN — CLINDAMYCIN PHOSPHATE 900 MG: 900 INJECTION, SOLUTION INTRAVENOUS at 15:41

## 2017-07-17 ASSESSMENT — LIFESTYLE VARIABLES: TOBACCO_USE: 0

## 2017-07-17 NOTE — ANESTHESIA POSTPROCEDURE EVALUATION
Patient: Shweta Pike    Procedure(s):  Right Biceps Tenotomy Arthroscopic Subacromial Decompression  ([facility]) - Wound Class: I-Clean    Diagnosis:Chronic Shoulder Pain  Diagnosis Additional Information: No value filed.    Anesthesia Type:  General    Note:  Anesthesia Post Evaluation    Patient location during evaluation: Phase 2 and PACU  Patient participation: Able to fully participate in evaluation  Level of consciousness: awake and alert  Pain management: adequate  Airway patency: patent  Cardiovascular status: acceptable  Respiratory status: acceptable  Hydration status: acceptable  PONV: none     Anesthetic complications: None          Last vitals:  Vitals:    07/17/17 1510 07/17/17 1718 07/17/17 1720   BP: 117/83 116/76 93/73   Pulse:      Resp: 12 16 26   Temp:  36.7  C (98.1  F)    SpO2: 93% 94% 93%         Electronically Signed By: Matthew Rosales DO  July 17, 2017  5:33 PM

## 2017-07-17 NOTE — IP AVS SNAPSHOT
MRN:[id number]                      After Visit Summary   7/17/2017    Shweta Pike    MRN: [id number]           Thank you!     Thank you for choosing [facility] for your care. Our goal is always to provide you with excellent care. Hearing back from our patients is one way we can continue to improve our services. Please take a few minutes to complete the written survey that you may receive in the mail after you visit with us. Thank you!        Patient Information     Date Of Birth          1980        About your hospital stay     You were admitted on:  July 17, 2017 [facility]    You were discharged on:  July 17, 2017       Who to Call     For medical emergencies, please call 911.  For non-urgent questions about your medical care, please call your primary care provider or clinic, [phone]  For questions related to your surgery, please call your surgery clinic        Attending Provider     Provider Quincy Rolon MD Orthopedics       Primary Care Provider Office Phone # Fax #    Nahed Lopez ORQUIDEA Olson [facility] [phone]      After Care Instructions     Discharge Instructions       Dr. Hines's Discharge Instructions: Shoulder Arthroscopy     Activities: You will be provided with a sling. Wear the sling at all times. Typically you will need to wear the sling for at least 6 weeks. Your surgeon will let you know if there are any additional recommendations. An ice pack may be used for pain relief, along with the prescription pain pills that were prescribed.    You will be taught Codman's (pendulum) exercises.  Please do these twice per day.    Anti-inflammatories/NSAIDs (Ibuprofen, Aleve, Motrin, etc.) are ok if needed for pain control.    Wound Care: You may remove your dressings in 4 days and shower. You may wet the wounds in the shower, but do not take baths. Redress the wounds with bandaids.  Leave the steri strips (sticky tapes) in place.     Follow-up within 10-14 days. Call for an appointment. [phone] ([place] or [facility]) or [phone] ([facility]).                  Your next 10 appointments already scheduled     Jul 31, 2017 11:20 AM CDT   (Arrive by 11:05 AM)   RETURN SHOULDER with Quincy Hines MD   [facility] ([facility])    [address] [place] [id number] [phone]            Aug 28, 2017 10:50 AM CDT   (Arrive by 10:35 AM)   RETURN SHOULDER with Quincy Hines MD   [facility] ([facility] [place])    [address] [place] [id number] [phone]              Further instructions from your care team       [facility]  Home Care Following Anesthesia  For 24 hours after surgery:  1. Get plenty of rest.  A responsible adult must stay with you for at least 24 hours after you leave the surgery center.  2. Do not drive or use heavy equipment.  If you have weakness or tingling, don't drive or use heavy equipment until this feeling goes away.   3. Do not drink alcohol.   4. Avoid strenuous or risky activities.  Ask for help when climbing stairs.  5. You may feel lightheaded.  IF so, sit for a few minutes before standing.  Have someone help you get up.   6. If you have nausea (feel sick to your stomach): Drink only clear liquids such as apple juice, ginger ale, broth or 7-Up.  Rest may also help.  Be sure to drink enough fluids.  Move to a regular diet as you feel able.   7. You may have a slight fever.  Call the doctor if your fever is over 100 F (37.7 C) (taken under the tongue) or lasts longer than 24 hours.  8. You may have a dry mouth, a sore throat, muscle aches or trouble sleeping. These should go away after 24 hours.  9. Do not make important or legal decisions.               Tips for taking pain  "medications  To get the best pain relief possible, remember these points:    Take pain medications as directed, before pain becomes severe.    Pain medication can upset your stomach: taking it with food may help.    Constipation is a common side effect of pain medication. Drink plenty of  fluids.    Eat foods high in fiber. Take a stool softener if recommended by your doctor or pharmacist.    Do not drink alcohol, drive or operate machinery while taking pain medications.    Ask about other ways to control pain, such as with heat, ice or relaxation.    Call a doctor for any of the followin. Signs of infection (fever, growing tenderness at the surgery site, a large amount of drainage or bleeding, severe pain, foul-smelling drainage, redness, swelling).  2. It has been over 8 to 10 hours since surgery and you are still not able to urinate (pass water).  3. Headache for over 24 hours.  4. Numbness, tingling or weakness the day after surgery (if you had spinal anesthesia).    Your doctor is:  Dr. Oh Hines, Orthopaedics: [phone]     Or dial [phone] and ask for the resident on call for:  Orthopaedics  For emergency care, call the:  [facility] Emergency Department: [phone] (TTY for hearing impaired: [phone])    Information about liposomal bupivacaine (Exparel)    What is Liposomal Bupivacaine?    Liposomal Bupivacaine is a numbing medication that can help you manage your pain after surgery.  This medication is similar to \"novacaine,\" which is often used by the dentist.  Liposomal bupivacaine is released slowly and can help control pain for up to 72 hours.    What is the purpose of Liposomal Bupivacaine?    To manage your pain after surgery    To help you sleep better, take deep breaths, walk more comfortable, and feel up to visiting with others    How is the procedure done?    Liposomal bupivacaine is a medication given by an injection.    It is usually given right before your surgery.  If " this is the case, you will be awake or sedated, but you should experience minimal pain during the procedure.    For some people, the injection may be given at the very end of your surgery.  It all depends on the type of surgery and your situation.    The procedure usually takes about 5-15 minutes.  An ultrasound machine will help the anesthesiologist insert it in the right place or the surgeon will inject it under direct vision.     A needle is used to place the numbing medication under your skin.  It provides pain relief by numbing the tissue in the area where your surgeon will make the incision.    What can I expect?    You may experience numbness, tingling, or a feeling of heaviness around the area that was injected.    If you experience any of the follow symptoms IMMEDIATELY CALL THE REGIONAL ANESTHESIA PAIN SERVICE:    Numbness or tingling occurs in areas other than around the injection site    Blurry vision    Ringing in your ears    A metallic taste in your mouth    PAGE: Dial [phone].  When prompted, enter the following 4-digit ID number:  [id number].  You will be prompted to enter your phone number; and then enter the # sign.  The clinician on call will call you back.    OR    CALL: Dial [phone].  Let the hospital  know that you are having a problem with a nerve block and that you would like to speak to the regional anesthesia pain service right away.    You should not receive any other type of numbing medication within 4 days after receiving liposomal bupivacaine unless your anesthesiologist approves.      Post Operative Instructions: Regional Anesthetic for Upper Extremity with Liposomal Bupivacaine  General Information:   Regional anesthesia is when local anesthetic or  numbing  medication is injected around the nerves to anesthetize or  numb  the area supplied by that set of nerves. It is a type of analgesia used to control pain and decreases the need for narcotics following  surgery.    Types of Regional Blocks:  Interscalene: A block injected into the neck on the operative shoulder/arm of a patient having shoulder surgery  Supraclavicular: A block injected near the clavicle on the operative shoulder of a patient having elbow, forearm, or hand surgery    Procedure:  The type of anesthesia your doctor used to numb your shoulder or arm will usually not start to wear off for 24-48 hours, but may last as long as 72 hours. You should be careful during that period, since it is possible to injure your arm without being aware of the injury. While your arm is numb, you should:    Avoid striking or bumping your arm    Avoid extreme hot or cold    Diet:  There are no restrictions on your diet. You should drink plenty of fluids.     Discomfort:  You will have a tingling and prickly sensation in your arm as the feeling begins to return. You can also expect some discomfort. The amount of discomfort is unpredictable, but if you have more pain than can be controlled with pain medication you should notify your physician.     Pain Medications:  Begin taking your oral pain pills before bedtime and during the night to avoid a sudden onset of pain as part of the block wears off.  Do not engage in drinking, driving, or hazardous occupations while taking pain medication.     Stitches:   You may have stitches or special skin closures. You doctor will inform you when to return to the office to have them removed.     Activity:  On the day of surgery you should try to stay in bed with your hand elevated on pillows. You may resume your normal activity after that, wearing a sling for comfort. Contact your physician if you have any of the problems:     Continued numbness or tingling in the arm or hand after 72 hours    Swelling of the fingers or fingers that are cold to the touch    Excessive bleeding or drainage    Severe pain                  Pending Results     No orders found from 7/15/2017 to 7/18/2017.        "     Admission Information     Date & Time Provider Department Dept. Phone    7/17/2017 Quincy Hines MD [facility] [phone]      Your Vitals Were     Blood Pressure Pulse Temperature Respirations Height Weight    116/76 83 98.1  F (36.7  C) (Temporal) 16 1.626 m (5' 4\") 72.6 kg (160 lb)    Pulse Oximetry BMI (Body Mass Index)                93% 27.46 kg/m2          [employer] Information     [employer] gives you secure access to your electronic health record. If you see a primary care provider, you can also send messages to your care team and make appointments. If you have questions, please call your primary care clinic.  If you do not have a primary care provider, please call [phone] and they will assist you.      [employer] is an electronic gateway that provides easy, online access to your medical records. With [employer], you can request a clinic appointment, read your test results, renew a prescription or communicate with your care team.     To access your existing account, please contact your [facility] or call [phone] for assistance.        Care EveryWhere ID     This is your Care EveryWhere ID. This could be used by other organizations to access your [place] medical records  [id number]        Equal Access to Services     YOVANNY ALEJANDRO : Grey carsono Soruiz, waaxda luqadaha, qaybta kaalmada adeegyada, kaden escobedo. So [facility] [phone].    ATENCIÓN: Si habla español, tiene a zamudio disposición servicios gratuitos de asistencia lingüística. Llclaudia al [phone].    We comply with applicable federal civil rights laws and [place] laws. We do not discriminate on the basis of race, color, national origin, age, disability sex, sexual orientation or gender identity.               Review of your medicines      START taking        Dose / Directions    gabapentin 300 MG capsule   Commonly known as:  NEURONTIN "        Dose:  300 mg   Take 1 capsule (300 mg) by mouth 3 times daily   Quantity:  30 capsule   Refills:  0       HYDROmorphone 2 MG tablet   Commonly known as:  DILAUDID        Dose:  2-4 mg   Take 1-2 tablets (2-4 mg) by mouth every 4 hours as needed for moderate to severe pain   Quantity:  30 tablet   Refills:  0       hydrOXYzine 25 MG capsule   Commonly known as:  VISTARIL        Dose:  25 mg   Take 1 capsule (25 mg) by mouth 3 times daily as needed for itching   Quantity:  30 capsule   Refills:  0         CONTINUE these medicines which may have CHANGED, or have new prescriptions. If we are uncertain of the size of tablets/capsules you have at home, strength may be listed as something that might have changed.        Dose / Directions    ibuprofen 800 MG tablet   Commonly known as:  ADVIL/MOTRIN   This may have changed:    - medication strength  - when to take this  - reasons to take this        Dose:  800 mg   Take 1 tablet (800 mg) by mouth 3 times daily (with meals)   Quantity:  42 tablet   Refills:  0         CONTINUE these medicines which have NOT CHANGED        Dose / Directions    propranolol 20 MG tablet   Commonly known as:  INDERAL   Used for:  Test anxiety        Dose:  20 mg   Take 1 tablet (20 mg) by mouth once as needed Take 60 minutes before testing. Can adjust up to 60 mg if HR and BP tolerating   Quantity:  15 tablet   Refills:  0       traZODone 100 MG tablet   Commonly known as:  DESYREL   Used for:  Persistent insomnia        TAKE 2 TABLETS BY MOUTH AT BEDTIME   Quantity:  180 tablet   Refills:  0         STOP taking     HYDROcodone-acetaminophen 5-325 MG per tablet   Commonly known as:  NORCO                Where to get your medicines      Some of these will need a paper prescription and others can be bought over the counter. Ask your nurse if you have questions.     Bring a paper prescription for each of these medications     gabapentin 300 MG capsule    HYDROmorphone 2 MG tablet     hydrOXYzine 25 MG capsule    ibuprofen 800 MG tablet                Protect others around you: Learn how to safely use, store and throw away your medicines at www.disposemymeds.org.             Medication List: This is a list of all your medications and when to take them. Check marks below indicate your daily home schedule. Keep this list as a reference.      Medications           Morning Afternoon Evening Bedtime As Needed    gabapentin 300 MG capsule   Commonly known as:  NEURONTIN   Take 1 capsule (300 mg) by mouth 3 times daily   Last time this was given:  300 mg on 7/17/2017  2:41 PM                                HYDROmorphone 2 MG tablet   Commonly known as:  DILAUDID   Take 1-2 tablets (2-4 mg) by mouth every 4 hours as needed for moderate to severe pain                                hydrOXYzine 25 MG capsule   Commonly known as:  VISTARIL   Take 1 capsule (25 mg) by mouth 3 times daily as needed for itching                                ibuprofen 800 MG tablet   Commonly known as:  ADVIL/MOTRIN   Take 1 tablet (800 mg) by mouth 3 times daily (with meals)                                propranolol 20 MG tablet   Commonly known as:  INDERAL   Take 1 tablet (20 mg) by mouth once as needed Take 60 minutes before testing. Can adjust up to 60 mg if HR and BP tolerating                                traZODone 100 MG tablet   Commonly known as:  DESYREL   TAKE 2 TABLETS BY MOUTH AT BEDTIME

## 2017-07-17 NOTE — ANESTHESIA PREPROCEDURE EVALUATION
Anesthesia Evaluation     . Pt has had prior anesthetic.     No history of anesthetic complications          ROS/MED HX    ENT/Pulmonary:     (+)asthma , . .   (-) tobacco use   Neurologic:  - neg neurologic ROS     Cardiovascular:  - neg cardiovascular ROS   (+) ----. : . . . :. . No previous cardiac testing       METS/Exercise Tolerance:  >4 METS   Hematologic:  - neg hematologic  ROS       Musculoskeletal:         GI/Hepatic:  - neg GI/hepatic ROS       Renal/Genitourinary:  - ROS Renal section negative       Endo:  - neg endo ROS       Psychiatric:  - neg psychiatric ROS       Infectious Disease:  - neg infectious disease ROS       Malignancy:      - no malignancy   Other:    - neg other ROS                 Physical Exam  Normal systems: cardiovascular, pulmonary and dental    Airway   Mallampati: I  TM distance: >3 FB  Neck ROM: full    Dental     Cardiovascular   Rhythm and rate: regular and normal      Pulmonary    breath sounds clear to auscultation                    Anesthesia Plan      History & Physical Review  History and physical reviewed and following examination; no interval change.    ASA Status:  1 .    NPO Status:  > 8 hours    Plan for General, LMA and Periph. Nerve Block for postop pain with Intravenous and Propofol induction. Maintenance will be Balanced.    PONV prophylaxis:  Ondansetron (or other 5HT-3) and Dexamethasone or Solumedrol       Postoperative Care  Postoperative pain management:  Multi-modal analgesia.      Consents  Anesthetic plan, risks, benefits and alternatives discussed with:  Patient.  Use of blood products discussed: No .   .                          .

## 2017-07-17 NOTE — ANESTHESIA CARE TRANSFER NOTE
Patient: Shweta Pike    Procedure(s):  Right Biceps Tenotomy Arthroscopic Subacromial Decompression  ([facility]) - Wound Class: I-Clean    Diagnosis: Chronic Shoulder Pain  Diagnosis Additional Information: No value filed.    Anesthesia Type:   General     Note:  Airway :Room Air  Patient transferred to:PACU  Comments:   Transferred to: PACU    Patient vital signs: stable    Airway: none    Monitors and alarms on; PIV intact and patent; simple face mask on at 6L/min 02; report given to PACU RN.     116/76, 91,16,95,98.1  Urmila Roland CRNA, APRN    7/17/2017  5:18 PM       Vitals: (Last set prior to Anesthesia Care Transfer)    CRNA VITALS  7/17/2017 1644 - 7/17/2017 1718      7/17/2017             Pulse: 110    SpO2: 96 %    Resp Rate (observed): (!)  1    Resp Rate (set): 10                Electronically Signed By: ORQUIDEA Kidd CRNA  July 17, 2017  5:18 PM

## 2017-07-17 NOTE — DISCHARGE INSTRUCTIONS
[facility]  Home Care Following Anesthesia  For 24 hours after surgery:  1. Get plenty of rest.  A responsible adult must stay with you for at least 24 hours after you leave the surgery center.  2. Do not drive or use heavy equipment.  If you have weakness or tingling, don't drive or use heavy equipment until this feeling goes away.   3. Do not drink alcohol.   4. Avoid strenuous or risky activities.  Ask for help when climbing stairs.  5. You may feel lightheaded.  IF so, sit for a few minutes before standing.  Have someone help you get up.   6. If you have nausea (feel sick to your stomach): Drink only clear liquids such as apple juice, ginger ale, broth or 7-Up.  Rest may also help.  Be sure to drink enough fluids.  Move to a regular diet as you feel able.   7. You may have a slight fever.  Call the doctor if your fever is over 100 F (37.7 C) (taken under the tongue) or lasts longer than 24 hours.  8. You may have a dry mouth, a sore throat, muscle aches or trouble sleeping. These should go away after 24 hours.  9. Do not make important or legal decisions.               Tips for taking pain medications  To get the best pain relief possible, remember these points:    Take pain medications as directed, before pain becomes severe.    Pain medication can upset your stomach: taking it with food may help.    Constipation is a common side effect of pain medication. Drink plenty of  fluids.    Eat foods high in fiber. Take a stool softener if recommended by your doctor or pharmacist.    Do not drink alcohol, drive or operate machinery while taking pain medications.    Ask about other ways to control pain, such as with heat, ice or relaxation.    Call a doctor for any of the followin. Signs of infection (fever, growing tenderness at the surgery site, a large amount of drainage or bleeding, severe pain, foul-smelling drainage, redness, swelling).  2. It has been over 8 to 10 hours  "since surgery and you are still not able to urinate (pass water).  3. Headache for over 24 hours.  4. Numbness, tingling or weakness the day after surgery (if you had spinal anesthesia).    Your doctor is:  Dr. Oh Hines, Orthopaedics: [phone]     Or dial [phone] and ask for the resident on call for:  Orthopaedics  For emergency care, call the:  [facility] Department: [phone] (TTY for hearing impaired: [phone])    Information about liposomal bupivacaine (Exparel)    What is Liposomal Bupivacaine?    Liposomal Bupivacaine is a numbing medication that can help you manage your pain after surgery.  This medication is similar to \"novacaine,\" which is often used by the dentist.  Liposomal bupivacaine is released slowly and can help control pain for up to 72 hours.    What is the purpose of Liposomal Bupivacaine?    To manage your pain after surgery    To help you sleep better, take deep breaths, walk more comfortable, and feel up to visiting with others    How is the procedure done?    Liposomal bupivacaine is a medication given by an injection.    It is usually given right before your surgery.  If this is the case, you will be awake or sedated, but you should experience minimal pain during the procedure.    For some people, the injection may be given at the very end of your surgery.  It all depends on the type of surgery and your situation.    The procedure usually takes about 5-15 minutes.  An ultrasound machine will help the anesthesiologist insert it in the right place or the surgeon will inject it under direct vision.     A needle is used to place the numbing medication under your skin.  It provides pain relief by numbing the tissue in the area where your surgeon will make the incision.    What can I expect?    You may experience numbness, tingling, or a feeling of heaviness around the area that was injected.    If you experience any of the follow symptoms IMMEDIATELY CALL THE " REGIONAL ANESTHESIA PAIN SERVICE:    Numbness or tingling occurs in areas other than around the injection site    Blurry vision    Ringing in your ears    A metallic taste in your mouth    PAGE: Dial [phone].  When prompted, enter the following 4-digit ID number:  [id number].  You will be prompted to enter your phone number; and then enter the # sign.  The clinician on call will call you back.    OR    CALL: Dial [phone].  Let the hospital  know that you are having a problem with a nerve block and that you would like to speak to the regional anesthesia pain service right away.    You should not receive any other type of numbing medication within 4 days after receiving liposomal bupivacaine unless your anesthesiologist approves.      Post Operative Instructions: Regional Anesthetic for Upper Extremity with Liposomal Bupivacaine  General Information:   Regional anesthesia is when local anesthetic or  numbing  medication is injected around the nerves to anesthetize or  numb  the area supplied by that set of nerves. It is a type of analgesia used to control pain and decreases the need for narcotics following surgery.    Types of Regional Blocks:  Interscalene: A block injected into the neck on the operative shoulder/arm of a patient having shoulder surgery  Supraclavicular: A block injected near the clavicle on the operative shoulder of a patient having elbow, forearm, or hand surgery    Procedure:  The type of anesthesia your doctor used to numb your shoulder or arm will usually not start to wear off for 24-48 hours, but may last as long as 72 hours. You should be careful during that period, since it is possible to injure your arm without being aware of the injury. While your arm is numb, you should:    Avoid striking or bumping your arm    Avoid extreme hot or cold    Diet:  There are no restrictions on your diet. You should drink plenty of fluids.     Discomfort:  You will have a tingling and prickly  sensation in your arm as the feeling begins to return. You can also expect some discomfort. The amount of discomfort is unpredictable, but if you have more pain than can be controlled with pain medication you should notify your physician.     Pain Medications:  Begin taking your oral pain pills before bedtime and during the night to avoid a sudden onset of pain as part of the block wears off.  Do not engage in drinking, driving, or hazardous occupations while taking pain medication.     Stitches:   You may have stitches or special skin closures. You doctor will inform you when to return to the office to have them removed.     Activity:  On the day of surgery you should try to stay in bed with your hand elevated on pillows. You may resume your normal activity after that, wearing a sling for comfort. Contact your physician if you have any of the problems:     Continued numbness or tingling in the arm or hand after 72 hours    Swelling of the fingers or fingers that are cold to the touch    Excessive bleeding or drainage    Severe pain

## 2017-07-17 NOTE — IP AVS SNAPSHOT
[facility]    [address] [place] [id number]    Phone:  [phone]    Fax:  [phone]                                       After Visit Summary   7/17/2017    Shweta Pike    MRN: [id number]           After Visit Summary Signature Page     I have received my discharge instructions, and my questions have been answered. I have discussed any challenges I see with this plan with the nurse or doctor.    ..........................................................................................................................................  Patient/Patient Representative Signature      ..........................................................................................................................................  Patient Representative Print Name and Relationship to Patient    ..................................................               ................................................  Date                                            Time    ..........................................................................................................................................  Reviewed by Signature/Title    ...................................................              ..............................................  Date                                                            Time

## 2017-07-17 NOTE — BRIEF OP NOTE
Pre-op Dx:    Right Shoulder Subacromial bursitis           Biceps disease               Post-op Dx:   Right Shoulder Subacromial bursitis           Biceps disease     Right shoulder high grade partial thickness infraspinatus tear           Procedure: Shoulder Arthroscopic Rotator Cuff Repair           Arthroscopic Subacromial                                                  Decompression           Arthroscopic Biceps Tenotomy    Surgeon:   Quincy Hines MD    Anaesthesia:   Interscalene block + General Anaesthesia    Findings:       Rotator Cuff tear    EBL:      < 25 cc    Complications:  No immediate.    Plan:                ** Codman's 2x/day.    ** Discharge to home.    ** Follow up 10-14 days.    ** Sling at all times.    ** May shower at POD #4 after dressing                            Removed.    ** No NSAIDs.    \

## 2017-07-17 NOTE — ANESTHESIA PROCEDURE NOTES
Peripheral Nerve Block Procedure Note    Staff:     Anesthesiologist:  CONSTANZA PORTER  Location: Pre-op  Procedure Start/Stop TImes:      7/17/2017 3:00 PM     7/17/2017 3:16 PM    patient identified, IV checked, site marked, risks and benefits discussed, informed consent, monitors and equipment checked, pre-op evaluation, at physician/surgeon's request and post-op pain management      Correct Patient: Yes      Correct Position: Yes      Correct Site: Yes      Correct Procedure: Yes      Correct Laterality:  Yes    Site Marked:  Yes  Procedure details:     Procedure:  Interscalene    ASA:  2 and 1    Diagnosis:  Shoulder surgery    Laterality:  Right    Position:  Supine    Sterile Prep: chloraprep, mask and sterile gloves      Local skin infiltration:  2% lidocaine    amount (mL):  2    Needle:  Short bevel    Needle gauge:  21    Needle length (inches):  4    Ultrasound: Yes      Ultrasound used to identify targeted nerve, plexus, or vascular structure and placed a needle adjacent to it      Permanent Image entered into patiient's record      Abnormal pain on injection: No      Blood Aspirated: No      Paresthesias:  No    Bleeding at site: No      Bolus via:  Needle    Infusion Method:  Single Shot    Complications:  None  Assessment/Narrative:      Informed consent obtained.  All risks and benefits of the nerve block discussed with the patient.  All questions answered and all parties agreed with the plan.   Discussed with Patient Off-Label use of Liposomal Bupivacaine (Exparel) for Nerve Block.    Relevant risks & benefits were discussed with patient.    All questions were answered and there was agreement to proceed.    Patient signed Off-Label Use of Exparel Consent Form.

## 2017-07-17 NOTE — OR NURSING
Right interscalene block with Exparel for post op pain control.  Tolerated procedure well.  Versed 1 mg and Fentanyl 50 mcg given.

## 2017-07-17 NOTE — PROGRESS NOTES
S. Patient was seen today, at [facility] preop for measurements/delivery of an Ultrasling IV for right shoulder.  No order released yet however staff confirms that patient does indeed need the sling    O/goal. To reduce motion and help with post-op support    A. At this time, I have provided patient with a size medium Don Flakita Ultrasling IV. Straps were trimmed and adjusted to achieve a custom fit for the patient.     P. Patient/staffing have been instructed to contact our facility with any future questions and/or concerns.    Yaron Ultrasling IV Instructions

## 2017-07-19 ENCOUNTER — TELEPHONE (OUTPATIENT)
Dept: ORTHOPEDICS | Facility: CLINIC | Age: 37
End: 2017-07-19

## 2017-07-19 DIAGNOSIS — Z98.890 S/P ROTATOR CUFF REPAIR: Primary | ICD-10-CM

## 2017-07-19 DIAGNOSIS — G89.18 ACUTE POST-OPERATIVE PAIN: ICD-10-CM

## 2017-07-19 RX ORDER — HYDROMORPHONE HYDROCHLORIDE 2 MG/1
2-4 TABLET ORAL EVERY 4 HOURS PRN
Qty: 60 TABLET | Refills: 0 | Status: SHIPPED | OUTPATIENT
Start: 2017-07-19 | End: 2017-07-19

## 2017-07-19 RX ORDER — HYDROMORPHONE HYDROCHLORIDE 4 MG/1
2-4 TABLET ORAL EVERY 4 HOURS PRN
Qty: 30 TABLET | Refills: 0 | Status: SHIPPED | OUTPATIENT
Start: 2017-07-19 | End: 2017-07-31

## 2017-07-19 NOTE — TELEPHONE ENCOUNTER
[facility]:  Nursing Note  SITUATION                                                      Shweta Pike is a 37 year old female pt. Is calling saying her pain is out of control.    BACKGROUND                                                      Patient is s/p Shoulder Arthroscopic Rotator Cuff Repair Arthroscopic Subacromial   on 07- by Dr. Mcdowell                                                               Date of last clinic appointment: 06/19/2017    Does patient have a future appointment scheduled?  Yes -  next appointment is on: 07- with Dr. Hines        ASSESSMENT      pain/symptom assessment  Pt. Called the on-call doctor at 6 pm on 07-, saying 2 tabs of the Dilaudid  Every 4 hours and Tylenol, Advil and Ice and Hydroxyzine 1 tab TID was not covering her.  The on call told her to increase the Dilaudid to 3 tabs every 3 hours and continue the Hydroxyzine, Tylenol, Advil and ice as she was doing.  She is running low and also the pain is some better but she is requesting some stronger pain medication.    PLAN                                                      Nursing Interventions: transferred call to Faviola Hines's nurse.  RECOMMENDED DISPOSITION:   Will comply with recommendation:     Patient/family can be reached at:     If further questions/concerns or if symptoms do not improve, worsen or new symptoms develop, patient/family are advised to call [phone], option #3 to speak with a triage nurse, as soon as possible.    Ayad Bryant L.P.N.

## 2017-07-19 NOTE — PROGRESS NOTES
Phone call from Faviola with Dr Vences office [place] Hydromorphone RX 2-4 mg every 4 hours as needed for severe post op pain #60  RX written here due to distance.   Patient aware to follow up with Dr Hines as planned.   Thanks Nahed Olson [facility]

## 2017-07-19 NOTE — TELEPHONE ENCOUNTER
Dr Hines was consulted and is agreeable with refilling Dilaudid, # 60, 1-2 tablets every 4 hours as needed.  He also recommends patient take Tylenol 500 mg, 2 tablets 3 times a day.  Patient states she is unable to  the prescription.  She reports her primary doctor at [facility], Nahed Olson, was agreeable with writing prescriptions authorized by Dr Hines.  Dr Hines was informed and agreeable with this.

## 2017-07-21 NOTE — OP NOTE
"  Surgeon / Clinician: Quincy Hines MD    DATE OF SURGERY:  07/17/2017.    PREOPERATIVE DIAGNOSES:    1.  Right shoulder biceps disease.  2.  Right shoulder subacromial bursitis.    POSTOPERATIVE DIAGNOSES:    1.  Right shoulder high grade partial thickness infraspinatus tear.  2.  Right shoulder complex labral tear with biceps tendon involvement.  3.  Right shoulder loose body.  4.  Right shoulder biceps tenosynovitis.  5.  Right shoulder subacromial bursitis.    SURGICAL PROCEDURE:    1.  Right shoulder arthroscopic rotator cuff repair.  2.  Right shoulder arthroscopic subacromial decompressive bony acromioplasty.    3.  Right shoulder extensive arthropathy in the glenohumeral joint with loose body removal and biceps tenotomy.    ANESTHESIA:  Interscalene blockade plus laryngeal mask.    ESTIMATED BLOOD LOSS:  25 mL.    IMPLANTS:  A 5.5 mm biocomposite corkscrew anchor x1.    INDICATIONS:  Ms. Shewta Pike is a very pleasant 37-year-old woman with a history of pain and disability in her shoulder.  She had a trial of nonsurgical management including activity modification and analgesics.  After discussion of the risks, benefits of surgical versus nonsurgical management, she elected to proceed with surgical remediation.      FINDINGS:  High grade undersurface partial thickness rotator cuff tear of the infraspinatus/supraspinatus junction consistent with internal impingement, her described mechanism of injury.    DESCRIPTION OF PROCEDURE:  The patient was positively identified in the preanesthesia care area.  Her surgical site was initialed by me and her consent was reviewed.  She was then taken to the operating theater.  She was placed in a beach chair position, prepped and draped in the usual sterile fashion for right upper extremity surgery.    Prior to surgical initiation a \"timeout\" was done in accordance with hospital policy.  Appropriate verification of delivery of prophylactic intravenous antibiotics was " performed.    After establishment of a posterior viewing portal, an anterior standard portal was made under direct visualization.  Diagnostic arthroscopy was then possible with findings as follows:  The upper portion of the subscapularis was intact.  Biceps was abnormal at its origin and frayed as it entered the bicipital groove.  There was an area of detached labrum hanging down into the glenohumeral joint.  The pouch had a 5 cm x 3 cm x 1 cm loose body.  The posterior labrum was frayed.  The undersurface of the posterior-superior cuff was torn in nearly full thickness.      The biceps tendon was tenotomized and debrided back to a stable rim.  I debrided the anterior, superior and posterior yordy.  I debrided the contents of the rotator interval.  I debrided the articular surface of the humerus and the glenoid and this frayed piece of anterior labrum.  I removed the loose body by grasping it and pulling it out through the anterior cannula.  With a PDS suture, I tagged the rotator cuff fraying.     I turned my attention to the subacromial space.  Upon entering the subacromial space, massive and significant bursitis was encountered.  I debrided this with the shaver and ablator and denuded the undersurface of the acromion of all soft tissues including the CL ligament revealing a large anterolaterally based spur.    I examined the spot where the tag stitch had been placed.  I put a shaver on this region and easily fell into a nearly full thickness rotator cuff defect.  I prepared the footprint with an arthroscopic curet and a shaver and placed a 5.5 mm biocomposite corkscrew and anchor.  I passed the horizontal limb with one of the strands and then a vertical limb medial to it tying the horizontal limb first and then the vertical creating an MAC stitch.  This effected an excellent reapproximation of the rotator cuff back to its prepared bed.  There was no gapping of the arm  with the side.  Her motion was vigorous in  internal and external rotation.    A motorized shantelle was then used to resect the undersurface of the acromion until a type 1 acromion morphology was created.  An anterolaterally based wedge of bone was removed the taper posteromedially.  This was verified from orthogonal views.      All instruments were removed.  Closure was with 3-0 interrupted Monocryl sutures.  Steri-Strips and a sterile nonadherent dressing were applied.  A sling was placed.    POSTOPERATIVE PLAN:    1.  The patient will be discharged home today on oral analgesics.  She should have Codman exercises but no other active positive range of motion at this time.  2.  At the 2 week dominick she will begin supine gravity eliminated, forward elevation, 0 to unlimited and passive external rotation to side 0 to unlimited (avoid sleep or stretch and cross body adduction).    3.  Total sling time will be 6 weeks.    4.  At the 3 month dominick she will have unrestricted full quadrant stretching, periscapular stabilization and strengthening.  Radiographs will also be obtained at that visit.        Quincy Hines MD    D:  07/17/2017 18:04 T:  07/21/2017 04:25  Document:  [id number]\LG\BM

## 2017-07-24 ENCOUNTER — NURSE TRIAGE (OUTPATIENT)
Dept: NURSING | Facility: CLINIC | Age: 37
End: 2017-07-24

## 2017-07-24 NOTE — TELEPHONE ENCOUNTER
Reason for Disposition    [1] Constant abdominal pain AND [2] present > 2 hours    Additional Information    Negative: [1] Vomiting AND [2] abdomen looks much more swollen than usual    Negative: Patient sounds very sick or weak to the triager    Negative: [1] Vomiting AND [2] contains bile (green color)    Protocols used: CONSTIPATION-ADULT-AH

## 2017-07-25 DIAGNOSIS — Z98.890 S/P SHOULDER SURGERY: Primary | ICD-10-CM

## 2017-07-31 ENCOUNTER — OFFICE VISIT (OUTPATIENT)
Dept: ORTHOPEDICS | Facility: CLINIC | Age: 37
End: 2017-07-31

## 2017-07-31 VITALS — HEIGHT: 63 IN | BODY MASS INDEX: 28.86 KG/M2 | WEIGHT: 162.9 LBS

## 2017-07-31 DIAGNOSIS — M25.511 PAIN IN JOINT OF RIGHT SHOULDER: Primary | ICD-10-CM

## 2017-07-31 RX ORDER — HYDROCODONE BITARTRATE AND ACETAMINOPHEN 5; 325 MG/1; MG/1
1 TABLET ORAL EVERY 6 HOURS PRN
Qty: 30 TABLET | Refills: 0 | Status: SHIPPED | OUTPATIENT
Start: 2017-07-31 | End: 2017-10-24

## 2017-07-31 RX ORDER — IBUPROFEN 800 MG/1
800 TABLET, FILM COATED ORAL
Qty: 42 TABLET | Refills: 0 | Status: SHIPPED | OUTPATIENT
Start: 2017-07-31 | End: 2017-08-02

## 2017-07-31 NOTE — PROGRESS NOTES
S:  Doing well.    O:  Incision clean, dry, and intact  Sets Deltoid  Wiggles fingers  Warm and well-perfused hand with palpable pulse    A/P:  Doing well  Advance per op report  Gave script for norco

## 2017-07-31 NOTE — LETTER
7/31/2017       RE: Shweta Pike  [address] [facility] [address]     Dear Colleague,    Thank you for referring your patient, Shweta Pike, to the [facility] at [facility]. Please see a copy of my visit note below.    S:  Doing well.    O:  Incision clean, dry, and intact  Sets Deltoid  Wiggles fingers  Warm and well-perfused hand with palpable pulse    A/P:  Doing well  Advance per op report  Gave script for narendra Hines MD

## 2017-07-31 NOTE — NURSING NOTE
"Reason For Visit:   Chief Complaint   Patient presents with     Surgical Followup     DOS 7/17/17 S/P Right Biceps Tenotomy Arthroscopic Subacromial Decompression. Right shoulder arthroscopic rotator cuff repair.     PCP: Nahed Olson  Ref: Same     ?  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  Date of injury: No specific injury  Type of injury: Overuse of shoulder from playing softball and volleyball   Date of surgery: 7/17/17  Type of surgery: Right biceps tenotomy arthroscopic subacromial decompression  Smoker: No        Left  hand dominant      SANE score  Affected shoulder: Right   Right shoulder SANE: 5  Left shoulder SANE: 90    Ht 1.607 m (5' 3.27\")  Wt 73.9 kg (162 lb 14.4 oz)  BMI 28.61 kg/m2      Pain Assessment  Patient Currently in Pain: Yes  0-10 Pain Scale: 2  Primary Pain Location: Shoulder  Pain Orientation: Right  Pain Descriptors: Discomfort, Sore, Dull, Aching  Alleviating Factors: Rest  Aggravating Factors: Movement        "

## 2017-07-31 NOTE — MR AVS SNAPSHOT
After Visit Summary   7/31/2017    Shweta Pike    MRN: [id number]           Patient Information     Date Of Birth          1980        Visit Information        Provider Department      7/31/2017 11:20 AM Quincy Hines MD [facility]        Today's Diagnoses     Pain in joint of right shoulder    -  1       Follow-ups after your visit        Your next 10 appointments already scheduled     Aug 03, 2017  9:00 AM CDT   Ortho Eval with Saloni Finn, PT   [facility] ([facility])    [address] [place] [address]   [phone]            Aug 28, 2017 10:50 AM CDT   (Arrive by 10:35 AM)   RETURN SHOULDER with Quincy Hines MD   [facility] ([facility])    [address] [address] [phone]              Who to contact     Please call your clinic at [phone] to:    Ask questions about your health    Make or cancel appointments    Discuss your medicines    Learn about your test results    Speak to your doctor   If you have compliments or concerns about an experience at your clinic, or if you wish to file a complaint, please contact [facility] Patient Relations at [phone] or email us at [email or web address]         Additional Information About Your Visit        MyChart Information     [employer] gives you secure access to your electronic health record. If you see a primary care provider, you can also send messages to your care team and make appointments. If you have questions, please call your primary care clinic.  If you do not have a primary care provider, please call [phone] and they will assist you.      [employer] is an electronic gateway that provides easy, online access to your medical records. With [employer], you can request a clinic appointment, read your test results, renew a  "prescription or communicate with your care team.     To access your existing account, please contact your [facility] or call [phone] for assistance.        Care EveryWhere ID     This is your Care EveryWhere ID. This could be used by other organizations to access your [place] medical records  [id number]        Your Vitals Were     Height BMI (Body Mass Index)                1.607 m (5' 3.27\") 28.61 kg/m2           Blood Pressure from Last 3 Encounters:   07/17/17 114/78   07/06/17 126/80   05/12/17 132/72    Weight from Last 3 Encounters:   07/31/17 73.9 kg (162 lb 14.4 oz)   07/17/17 72.6 kg (160 lb)   07/06/17 72.6 kg (160 lb)              Today, you had the following     No orders found for display         Today's Medication Changes          These changes are accurate as of: 7/31/17 11:37 AM.  If you have any questions, ask your nurse or doctor.               Start taking these medicines.        Dose/Directions    HYDROcodone-acetaminophen 5-325 MG per tablet   Commonly known as:  NORCO   Used for:  Pain in joint of right shoulder        Dose:  1 tablet   Take 1 tablet by mouth every 6 hours as needed for moderate to severe pain   Quantity:  30 tablet   Refills:  0         These medicines have changed or have updated prescriptions.        Dose/Directions    * ibuprofen 800 MG tablet   Commonly known as:  ADVIL/MOTRIN   This may have changed:  Another medication with the same name was added. Make sure you understand how and when to take each.        Dose:  800 mg   Take 1 tablet (800 mg) by mouth 3 times daily (with meals)   Quantity:  42 tablet   Refills:  0       * ibuprofen 800 MG tablet   Commonly known as:  ADVIL/MOTRIN   This may have changed:  You were already taking a medication with the same name, and this prescription was added. Make sure you understand how and when to take each.   Used for:  Pain in joint of right shoulder        Dose:  800 mg   Take 1 tablet (800 " mg) by mouth 3 times daily (with meals)   Quantity:  42 tablet   Refills:  0       * Notice:  This list has 2 medication(s) that are the same as other medications prescribed for you. Read the directions carefully, and ask your doctor or other care provider to review them with you.      Stop taking these medicines if you haven't already. Please contact your care team if you have questions.     gabapentin 300 MG capsule   Commonly known as:  NEURONTIN           HYDROmorphone 2 MG tablet   Commonly known as:  DILAUDID           HYDROmorphone 4 MG tablet   Commonly known as:  DILAUDID           hydrOXYzine 25 MG capsule   Commonly known as:  VISTARIL                Where to get your medicines      Some of these will need a paper prescription and others can be bought over the counter.  Ask your nurse if you have questions.     Bring a paper prescription for each of these medications     HYDROcodone-acetaminophen 5-325 MG per tablet    ibuprofen 800 MG tablet                Primary Care Provider Office Phone # Fax #    ORQUIDEA Aleman [facility] [phone]       [facility] [place] [address] [place] [address]        Equal Access to Services     YOVANNY ALEJANDRO : Hadii codie ku hadasho Soomaali, waaxda luqadaha, qaybta kaalmada adeegyada, waxay mariahin hayjessica easton . So [facility] [phone].    ATENCIÓN: Si habla español, tiene a zamudio disposición servicios gratuitos de asistencia lingüística. Llame al [phone].    We comply with applicable federal civil rights laws and [place] laws. We do not discriminate on the basis of race, color, national origin, age, disability sex, sexual orientation or gender identity.            Thank you!     Thank you for choosing [facility]  for your care. Our goal is always to provide you with excellent care. Hearing back from our patients is one way we can continue to improve our services. Please take a few minutes to complete the  written survey that you may receive in the mail after your visit with us. Thank you!             Your Updated Medication List - Protect others around you: Learn how to safely use, store and throw away your medicines at www.disposemymeds.org.          This list is accurate as of: 7/31/17 11:37 AM.  Always use your most recent med list.                   Brand Name Dispense Instructions for use Diagnosis    HYDROcodone-acetaminophen 5-325 MG per tablet    NORCO    30 tablet    Take 1 tablet by mouth every 6 hours as needed for moderate to severe pain    Pain in joint of right shoulder       * ibuprofen 800 MG tablet    ADVIL/MOTRIN    42 tablet    Take 1 tablet (800 mg) by mouth 3 times daily (with meals)        * ibuprofen 800 MG tablet    ADVIL/MOTRIN    42 tablet    Take 1 tablet (800 mg) by mouth 3 times daily (with meals)    Pain in joint of right shoulder       propranolol 20 MG tablet    INDERAL    15 tablet    Take 1 tablet (20 mg) by mouth once as needed Take 60 minutes before testing. Can adjust up to 60 mg if HR and BP tolerating    Test anxiety       traZODone 100 MG tablet    DESYREL    180 tablet    TAKE 2 TABLETS BY MOUTH AT BEDTIME    Persistent insomnia       * Notice:  This list has 2 medication(s) that are the same as other medications prescribed for you. Read the directions carefully, and ask your doctor or other care provider to review them with you.

## 2017-07-31 NOTE — LETTER
Return to Work  2017     Seen today: yes    Patient:  Shweta Pike  :   1980  MRN:     [id number]  Physician: MARK HINES    Shweta Pike may not return to work until after her six week post-operative evaluation.      The next clinic appointment is scheduled for (date/time) 2017 at 10:50 am.          Electronically signed by Mark Hines MD

## 2017-08-02 ENCOUNTER — OFFICE VISIT (OUTPATIENT)
Dept: FAMILY MEDICINE | Facility: CLINIC | Age: 37
End: 2017-08-02
Payer: COMMERCIAL

## 2017-08-02 VITALS
TEMPERATURE: 98.6 F | HEIGHT: 64 IN | DIASTOLIC BLOOD PRESSURE: 62 MMHG | SYSTOLIC BLOOD PRESSURE: 102 MMHG | WEIGHT: 160 LBS | BODY MASS INDEX: 27.31 KG/M2 | HEART RATE: 78 BPM | OXYGEN SATURATION: 98 % | RESPIRATION RATE: 14 BRPM

## 2017-08-02 DIAGNOSIS — M54.2 CERVICALGIA: Primary | ICD-10-CM

## 2017-08-02 DIAGNOSIS — M25.511 PAIN IN JOINT OF RIGHT SHOULDER: ICD-10-CM

## 2017-08-02 PROCEDURE — 99214 OFFICE O/P EST MOD 30 MIN: CPT | Performed by: NURSE PRACTITIONER

## 2017-08-02 RX ORDER — METHOCARBAMOL 500 MG/1
1000 TABLET, FILM COATED ORAL 3 TIMES DAILY PRN
Qty: 90 TABLET | Refills: 1 | Status: SHIPPED | OUTPATIENT
Start: 2017-08-02 | End: 2017-10-24

## 2017-08-02 RX ORDER — IBUPROFEN 800 MG/1
800 TABLET, FILM COATED ORAL
Qty: 90 TABLET | Refills: 1 | Status: SHIPPED | OUTPATIENT
Start: 2017-08-02 | End: 2017-10-24

## 2017-08-02 NOTE — MR AVS SNAPSHOT
After Visit Summary   8/2/2017    Shweta Pike    MRN: [id number]           Patient Information     Date Of Birth          1980        Visit Information        Provider Department      8/2/2017 11:40 AM Nahed Olson APRN CNP [facility]        Today's Diagnoses     Cervicalgia    -  1    Pain in joint of right shoulder           Follow-ups after your visit        Your next 10 appointments already scheduled     Aug 03, 2017  9:00 AM CDT   Ortho Eval with Saloni Finn, PT   [facility] ([facility])    [address] [place] [address]   [phone]            Aug 28, 2017 10:50 AM CDT   (Arrive by 10:35 AM)   RETURN SHOULDER with Quincy Hines MD   [facility] ([facility])    [address] [place] [address] [phone]              Who to contact     If you have questions or need follow up information about today's clinic visit or your schedule please contact [facility] directly at [phone].  Normal or non-critical lab and imaging results will be communicated to you by [employer], letter or phone within 4 business days after the clinic has received the results. If you do not hear from us within 7 days, please contact the clinic through [employer] or phone. If you have a critical or abnormal lab result, we will notify you by phone as soon as possible.  Submit refill requests through [employer] or call your pharmacy and they will forward the refill request to us. Please allow 3 business days for your refill to be completed.          Additional Information About Your Visit        [employer] Information     [employer] gives you secure access to your electronic health record. If you see a primary care provider, you can also send messages to your care team and make appointments. If you have questions, please call your primary care clinic.   "If you do not have a primary care provider, please call [phone] and they will assist you.        Care EveryWhere ID     This is your Care EveryWhere ID. This could be used by other organizations to access your [place] medical records  [id number]        Your Vitals Were     Pulse Temperature Respirations Height Pulse Oximetry BMI (Body Mass Index)    78 98.6  F (37  C) (Tympanic) 14 5' 4\" (1.626 m) 98% 27.46 kg/m2       Blood Pressure from Last 3 Encounters:   07/17/17 114/78   07/06/17 126/80   05/12/17 132/72    Weight from Last 3 Encounters:   08/02/17 160 lb (72.6 kg)   07/31/17 162 lb 14.4 oz (73.9 kg)   07/17/17 160 lb (72.6 kg)              Today, you had the following     No orders found for display         Today's Medication Changes          These changes are accurate as of: 8/2/17 12:29 PM.  If you have any questions, ask your nurse or doctor.               Start taking these medicines.        Dose/Directions    methocarbamol 500 MG tablet   Commonly known as:  ROBAXIN   Used for:  Cervicalgia   Started by:  Nahed Olson APRN CNP        Dose:  1000 mg   Take 2 tablets (1,000 mg) by mouth 3 times daily as needed for muscle spasms   Quantity:  90 tablet   Refills:  1         These medicines have changed or have updated prescriptions.        Dose/Directions    ibuprofen 800 MG tablet   Commonly known as:  ADVIL/MOTRIN   This may have changed:  Another medication with the same name was removed. Continue taking this medication, and follow the directions you see here.   Used for:  Pain in joint of right shoulder   Changed by:  Nahed Olson APRN CNP        Dose:  800 mg   Take 1 tablet (800 mg) by mouth 3 times daily (with meals)   Quantity:  90 tablet   Refills:  1         Stop taking these medicines if you haven't already. Please contact your care team if you have questions.     traZODone 100 MG tablet   Commonly known as:  DESYREL   Stopped by:  Nahed Olson APRN CNP     "            Where to get your medicines      These medications [place] [facility] [place] [place] [address]  [address] [address]     Phone:  [phone]     ibuprofen 800 MG tablet    methocarbamol 500 MG tablet                Primary Care Provider Office Phone # Fax #    ORQUIDEA Aleman [phone]       [facility] [address] [facility] [address]        Equal Access to Services     YOVANNY ALEJANDRO : Hadii aad ku hadasho Soomaali, waaxda luqadaha, qaybta kaalmada adeegyada, waxay idiin hayaan adeeg khararickie laed . So [facility] [phone].    ATENCIÓN: Si habla español, tiene a zamudio disposición servicios gratuitos de asistencia lingüística. [facility] [phone].    We comply with applicable federal civil rights laws and [place] laws. We do not discriminate on the basis of race, color, national origin, age, disability sex, sexual orientation or gender identity.            Thank you!     Thank you for choosing [facility]  for your care. Our goal is always to provide you with excellent care. Hearing back from our patients is one way we can continue to improve our services. Please take a few minutes to complete the written survey that you may receive in the mail after your visit with us. Thank you!             Your Updated Medication List - Protect others around you: Learn how to safely use, store and throw away your medicines at www.disposemymeds.org.          This list is accurate as of: 8/2/17 12:29 PM.  Always use your most recent med list.                   Brand Name Dispense Instructions for use Diagnosis    HYDROcodone-acetaminophen 5-325 MG per tablet    NORCO    30 tablet    Take 1 tablet by mouth every 6 hours as needed for moderate to severe pain    Pain in joint of right shoulder       ibuprofen 800 MG tablet    ADVIL/MOTRIN    90 tablet    Take 1 tablet (800 mg) by mouth 3 times daily (with meals)     Pain in joint of right shoulder       methocarbamol 500 MG tablet    ROBAXIN    90 tablet    Take 2 tablets (1,000 mg) by mouth 3 times daily as needed for muscle spasms    Cervicalgia       propranolol 20 MG tablet    INDERAL    15 tablet    Take 1 tablet (20 mg) by mouth once as needed Take 60 minutes before testing. Can adjust up to 60 mg if HR and BP tolerating    Test anxiety

## 2017-08-02 NOTE — NURSING NOTE
"Chief Complaint   Patient presents with     Insomnia       Initial Ht 5' 4\" (1.626 m)  Wt 160 lb (72.6 kg)  BMI 27.46 kg/m2 Estimated body mass index is 27.46 kg/(m^2) as calculated from the following:    Height as of this encounter: 5' 4\" (1.626 m).    Weight as of this encounter: 160 lb (72.6 kg).  Medication Reconciliation: complete    Health Maintenance that is potentially due pending provider review:  NONE    n/a    Is there anyone who you would like to be able to receive your results? No  If yes have patient fill out NASIR    "

## 2017-08-02 NOTE — PROGRESS NOTES
SUBJECTIVE:                                                    Shweta Pike is a 37 year old female who presents to clinic today for the following health issues:      Insomnia      Duration: FEW WEEKS     Description  Frequency of insomnia:  nightly  Only getting about 3 hours at night    Accompanying signs and symptoms:  pain    History  Similar episodes in past:  YES  Previous evaluation/sleep study:  no     Precipitating or alleviating factors:  New stressful situation: YES- surgery in June   Caffeine intake after lunchtime: no   OTC decongestants: no   Any new medications: YES- pain medications     Therapies tried and outcome: none    Taking 2 trazadone at a time.  Not sleeping  Not a lot of pain just really uncomfortable.   Would like to try something different.   No work until after August 28th,       Surgical Followup        DOS 7/17/17 S/P Right Biceps Tenotomy Arthroscopic Subacromial Decompression. Right shoulder arthroscopic rotator cuff repair.      ?  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  Date of injury: No specific injury  Type of injury: Overuse of shoulder from playing softball and volleyball   Date of surgery: 7/17/17  Type of surgery: Right biceps tenotomy arthroscopic subacromial decompression  Smoker: No          Left  hand dominant but does most things right handed.   Not even able to concentrate to study.     Sling in place    7/17/2017     POSTOPERATIVE DIAGNOSES:    1.  Right shoulder high grade partial thickness infraspinatus tear.  2.  Right shoulder complex labral tear with biceps tendon involvement.  3.  Right shoulder loose body.  4.  Right shoulder biceps tenosynovitis.  5.  Right shoulder subacromial bursitis.    -------------------------------------    Problem list and histories reviewed & adjusted, as indicated.  Additional history: as documented    Labs reviewed in EPIC    Reviewed and updated as needed this visit by  "clinical staffAllergies       Reviewed and updated as needed this visit by Provider         ROS:   ROS: 10 point ROS neg other than the symptoms noted above in the HPI.      OBJECTIVE:                                                    /62  Pulse 78  Temp 98.6  F (37  C) (Tympanic)  Resp 14  Ht 5' 4\" (1.626 m)  Wt 160 lb (72.6 kg)  SpO2 98%  BMI 27.46 kg/m2  Body mass index is 27.46 kg/(m^2).   GENERAL: healthy, alert, well nourished, well hydrated, no distress  HENT: ear canals- normal; TMs- normal; Nose- normal; Mouth- no ulcers, no lesions  NECK: no tenderness, no adenopathy, no asymmetry, no masses, no stiffness; thyroid- normal to palpation  RESP: lungs clear to auscultation - no rales, no rhonchi, no wheezes  CV: regular rates and rhythm, normal S1 S2, no S3 or S4 and no murmur, no click or rub -  ABDOMEN: soft, no tenderness, no  hepatosplenomegaly, no masses, normal bowel sounds  MS: extremities- no gross deformities noted, left arm in sling. Cms to hand intact.     Diagnostic test results:  Results for orders placed or performed in visit on 07/17/17   Peripheral/Paravetebral Block    Narrative    Matthew Porter,      7/17/2017  3:15 PM    Peripheral Nerve Block Procedure Note    Staff:     Anesthesiologist:  MATTHEW PORTER  Location: Pre-op  Procedure Start/Stop TImes:      7/17/2017 3:00 PM     7/17/2017 3:16 PM    patient identified, IV checked, site marked, risks and benefits   discussed, informed consent, monitors and equipment checked, pre-op   evaluation, at physician/surgeon's request and post-op pain management      Correct Patient: Yes      Correct Position: Yes      Correct Site: Yes      Correct Procedure: Yes      Correct Laterality:  Yes    Site Marked:  Yes  Procedure details:     Procedure:  Interscalene    ASA:  2 and 1    Diagnosis:  Shoulder surgery    Laterality:  Right    Position:  Supine    Sterile Prep: chloraprep, mask and sterile gloves      Local skin " infiltration:  2% lidocaine    amount (mL):  2    Needle:  Short bevel    Needle gauge:  21    Needle length (inches):  4    Ultrasound: Yes      Ultrasound used to identify targeted nerve, plexus, or vascular   structure and placed a needle adjacent to it      Permanent Image entered into patiient's record      Abnormal pain on injection: No      Blood Aspirated: No      Paresthesias:  No    Bleeding at site: No      Bolus via:  Needle    Infusion Method:  Single Shot    Complications:  None  Assessment/Narrative:      Informed consent obtained.  All risks and benefits of the nerve block discussed with the patient.  All questions answered and all parties agreed with the plan.   Discussed with Patient Off-Label use of Liposomal Bupivacaine (Exparel)   for Nerve Block.    Relevant risks & benefits were discussed with patient.    All questions were answered and there was agreement to proceed.    Patient signed Off-Label Use of Exparel Consent Form.              ASSESSMENT/PLAN:                                                    1. Cervicalgia  Rest, ice, topical agents.   - methocarbamol (ROBAXIN) 500 MG tablet; Take 2 tablets (1,000 mg) by mouth 3 times daily as needed for muscle spasms  Dispense: 90 tablet; Refill: 1    2. Pain in joint of right shoulder  Refilled NSAID  - ibuprofen (ADVIL/MOTRIN) 800 MG tablet; Take 1 tablet (800 mg) by mouth 3 times daily (with meals)  Dispense: 90 tablet; Refill: 1      Follow up with Provider - ORTHO and Physical Therapy as planned.  Call or return to the clinic with any worsening of symptoms or no resolution. Patient/Parent verbalized understanding and is in agreement. Medication side effects reviewed.   Current Outpatient Prescriptions   Medication Sig Dispense Refill     methocarbamol (ROBAXIN) 500 MG tablet Take 2 tablets (1,000 mg) by mouth 3 times daily as needed for muscle spasms 90 tablet 1     ibuprofen (ADVIL/MOTRIN) 800 MG tablet Take 1 tablet (800 mg) by mouth 3  times daily (with meals) 90 tablet 1     HYDROcodone-acetaminophen (NORCO) 5-325 MG per tablet Take 1 tablet by mouth every 6 hours as needed for moderate to severe pain 30 tablet 0     propranolol (INDERAL) 20 MG tablet Take 1 tablet (20 mg) by mouth once as needed Take 60 minutes before testing. Can adjust up to 60 mg if HR and BP tolerating 15 tablet 0        See Patient Instructions    [facility]A [facility]

## 2017-08-03 ENCOUNTER — HOSPITAL ENCOUNTER (OUTPATIENT)
Dept: PHYSICAL THERAPY | Facility: CLINIC | Age: 37
Setting detail: THERAPIES SERIES
End: 2017-08-03
Attending: ORTHOPAEDIC SURGERY
Payer: COMMERCIAL

## 2017-08-03 PROCEDURE — 97110 THERAPEUTIC EXERCISES: CPT | Mod: GP | Performed by: PHYSICAL THERAPIST

## 2017-08-03 PROCEDURE — 97161 PT EVAL LOW COMPLEX 20 MIN: CPT | Mod: GP | Performed by: PHYSICAL THERAPIST

## 2017-08-03 PROCEDURE — 40000718 ZZHC STATISTIC PT DEPARTMENT ORTHO VISIT: Performed by: PHYSICAL THERAPIST

## 2017-08-04 NOTE — PROGRESS NOTES
08/03/17 0800   General Information   Type of Visit Initial OP Ortho PT Evaluation   Start of Care Date 08/03/17   Referring Physician Breanne Francis LPN    Patient/Family Goals Statement volleyball, bowling, get back to work    Orders Evaluate and Treat   Orders Comment Physical Therapy Referral for supine gravity eliminated, forward elevation, 0 to unlimited and passive external rotation to side 0 to unlimited (avoid sleep or stretch and cross body adduction) - POSTOPERATIVE PLAN:  1.  The patient will be discharged home today on oral analgesics.  She should have Codman exercises but no other active positive range of motion at this time. 2.  At the 2 week dominick she will begin supine gravity eliminated, forward elevation, 0 to unlimited and passive external rotation to side 0 to unlimited (avoid sleep or stretch and cross body adduction).   3.  Total slingtime will be 6 weeks.  4.  At the 3 month dominick she will have unrestricted full quadrant stretching, periscapular stabilization and strengthening.  Radiographs will also be obtained at that visit. - avoid sleep or stretch and cross body adduction - Right Biceps Tenotomy Arthroscopic Subacromial Decompression Right shoulder biceps disease   Date of Order 07/25/17   Insurance Type Other   Insurance Comments/Visits Authorized Blue Plus - 40    Medical Diagnosis Right shoulder biceps disease   Surgical/Medical history reviewed Yes   Precautions/Limitations no known precautions/limitations   Body Part(s)   Body Part(s) Shoulder   Presentation and Etiology   Pertinent history of current problem (include personal factors and/or comorbidities that impact the POC) Pt relates chronic shoulder pain over the years most likely from softball, volleyball, bowling, etc. Pt had multiple cortisone injections and finally had surgery on 7/17/17. Pt relates she was not expecting for them to repair as much as they did, she relates she had a SAD, RTC repair and Biceps tenotomy. Pt  relates she is to wear sling until she returns to MD on 8/28/17. Pt would like to return to volleyball/bowling. Pt is off work in 8/28/17. PMH: asthma, broken bones, smoking, shoulder surgery, submandibular, sinus, hysterectomy   Impairments A. Pain;B. Decreased WB tolerance;D. Decreased ROM;E. Decreased flexibility;F. Decreased strength and endurance   Functional Limitations perform activities of daily living   Symptom Location R shoulder   How/Where did it occur With repetition/overuse   Onset date of current episode/exacerbation 07/17/17   Chronicity New   Pain rating (0-10 point scale) Best (/10);Worst (/10)   Best (/10) 2   Worst (/10) 10   Pain quality A. Sharp;B. Dull;C. Aching   Frequency of pain/symptoms A. Constant   Pain/symptoms exacerbated by C. Lifting;G. Certain positions;H. Overhead reach;J. ADL;K. Home tasks   Pain/symptoms eased by H. Cold   Progression of symptoms since onset: Improved   Current Level of Function   Current Community Support Family/friend caregiver   Patient role/employment history A. 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 No   Per patient - Fall with injury in past year? No   Is patient a fall risk? No   Functional Scales   Functional Scales Other   Other Scales  SPADI:92%    Shoulder Objective Findings   Side (if bilateral, select both right and left) Right   Integumentary  mod scar tissue at incision however she is healing well - 90% closed   Posture fwd/slouched posture, wearing sling   Cervical Screen (ROM, quadrant) WNL  however pt relates neck has been sore   Shoulder Flexibility Comments Elbow ROM: full PROM    Right Shoulder Flexion PROM 70   Right Shoulder Abduction PROM 45 in scapular plane   Right Shoulder ER PROM 0 at 20 deg abd    Right Shoulder IR PROM 30 at 20 deg abd    Right Shoulder  Flexion Strength Held due to post op precautions    Right Shoulder Abduction Strength Held due to post op precautions    Right Shoulder ER Strength Held due to post op precautions    Right Shoulder IR Strength Held due to post op precautions    Right Shoulder Extension Strength Held due to post op precautions    Planned Therapy Interventions   Planned Therapy Interventions joint mobilization;manual therapy;neuromuscular re-education;ROM;strengthening;stretching   Planned Modality Interventions   Planned Modality Interventions Cryotherapy;Electrical stimulation;TENS   Clinical Impression   Criteria for Skilled Therapeutic Interventions Met yes, treatment indicated   PT Diagnosis weakness, limited ROM and pain s/p shoulder surgery   Influenced by the following impairments weakness, pain, limited ROM   Functional limitations due to impairments ADL's, work, recreational activitiies   Clinical Presentation Stable/Uncomplicated   Clinical Presentation Rationale Pt is coreyaslm 37 yr old female s/p shoulder surgery. Complicating factors include chronic shoulder pain, aand activie ifestyle. Pt is very motivated to get better   Clinical Decision Making (Complexity) Low complexity   Therapy Frequency 2 times/Week   Predicted Duration of Therapy Intervention (days/wks) 8 weeks   Risk & Benefits of therapy have been explained Yes   Patient, Family & other staff in agreement with plan of care Yes   Education Assessment   Preferred Learning Style Listening;Reading;Pictures/video;Demonstration   Barriers to Learning No barriers   ORTHO GOALS   PT Ortho Eval Goals 1;2;3;4   Ortho Goal 1   Goal Identifier ROM   Goal Description Pt will demonstrate full  GH AROM with less than 1/10 pain in order to be able to don/doff jacket/shirt to return to PLOF w/o  pain.   Target Date 08/31/17   Ortho Goal 2   Goal Identifier MMT   Goal Description Pt will demonstrate 5/5 GH abd/add/ ER/IR in order to be able to return to PLOF and complete  work  tasks    Target Date 08/31/17   Ortho Goal 3   Goal Identifier return to sport    Goal Description Pt will return full return to sport w less than 1/10 pain in R shoulder   Target Date 09/28/17   Ortho Goal 4   Goal Identifier SPADI   Goal   Description Pt will report <10% disability on SPADI to demonstrate improved ability to complete daily activities and demonstrate significant clinical improvement   Target Date 09/28/17   Total Evaluation Time   Total Evaluation Time 35 (15 eval, 5 ADL, 15 TE)        Saloni Finn  Physical Therapist  [facility]  [address] [place], [place] [id number]  [email or web address]   [email or web address]   Office: [phone] Fax: [phone]

## 2017-08-07 ENCOUNTER — HOSPITAL ENCOUNTER (OUTPATIENT)
Dept: PHYSICAL THERAPY | Facility: CLINIC | Age: 37
Setting detail: THERAPIES SERIES
End: 2017-08-07
Attending: ORTHOPAEDIC SURGERY
Payer: COMMERCIAL

## 2017-08-07 PROCEDURE — 40000718 ZZHC STATISTIC PT DEPARTMENT ORTHO VISIT: Performed by: PHYSICAL THERAPIST

## 2017-08-07 PROCEDURE — 97110 THERAPEUTIC EXERCISES: CPT | Mod: GP | Performed by: PHYSICAL THERAPIST

## 2017-08-08 ENCOUNTER — TELEPHONE (OUTPATIENT)
Dept: ORTHOPEDICS | Facility: CLINIC | Age: 37
End: 2017-08-08

## 2017-08-08 NOTE — TELEPHONE ENCOUNTER
Patient is S/P right arthroscopic rotator cuff repair, subacromial decompression 07/17/2017.  Therapist Saloni at [facility] wants to know if isometric exercises and scapular retraction for posture can be started.    Dr Hines was consulted.  Recommendation is to not do the above exercises.  Patient should only do supine gravity eliminated forward elevation and passive external rotation to the side 0-unlimited.     Message left on therapist's voicemail ([phone]) with instructions to call for any further questions.

## 2017-08-14 ENCOUNTER — HOSPITAL ENCOUNTER (OUTPATIENT)
Dept: PHYSICAL THERAPY | Facility: CLINIC | Age: 37
Setting detail: THERAPIES SERIES
End: 2017-08-14
Attending: ORTHOPAEDIC SURGERY
Payer: COMMERCIAL

## 2017-08-14 PROCEDURE — 40000718 ZZHC STATISTIC PT DEPARTMENT ORTHO VISIT: Performed by: PHYSICAL THERAPIST

## 2017-08-14 PROCEDURE — 97110 THERAPEUTIC EXERCISES: CPT | Mod: GP | Performed by: PHYSICAL THERAPIST

## 2017-08-23 ENCOUNTER — HOSPITAL ENCOUNTER (OUTPATIENT)
Dept: PHYSICAL THERAPY | Facility: CLINIC | Age: 37
Setting detail: THERAPIES SERIES
End: 2017-08-23
Attending: ORTHOPAEDIC SURGERY
Payer: COMMERCIAL

## 2017-08-23 PROCEDURE — 97110 THERAPEUTIC EXERCISES: CPT | Mod: GP | Performed by: PHYSICAL THERAPIST

## 2017-08-23 PROCEDURE — 40000718 ZZHC STATISTIC PT DEPARTMENT ORTHO VISIT: Performed by: PHYSICAL THERAPIST

## 2017-08-28 ENCOUNTER — OFFICE VISIT (OUTPATIENT)
Dept: ORTHOPEDICS | Facility: CLINIC | Age: 37
End: 2017-08-28

## 2017-08-28 VITALS — HEIGHT: 64 IN | BODY MASS INDEX: 26.92 KG/M2 | WEIGHT: 157.7 LBS

## 2017-08-28 DIAGNOSIS — S46.911A RIGHT SHOULDER STRAIN, INITIAL ENCOUNTER: Primary | ICD-10-CM

## 2017-08-28 DIAGNOSIS — Z53.9 ERRONEOUS ENCOUNTER--DISREGARD: Primary | ICD-10-CM

## 2017-08-28 RX ORDER — HYDROCODONE BITARTRATE AND ACETAMINOPHEN 5; 325 MG/1; MG/1
1 TABLET ORAL EVERY 6 HOURS PRN
Qty: 20 TABLET | Refills: 0 | Status: SHIPPED | OUTPATIENT
Start: 2017-08-28 | End: 2017-10-24

## 2017-08-28 NOTE — PROGRESS NOTES
S:  Doing well.    O:  Incision clean, dry, and intact  Sets Deltoid  Wiggles fingers  Warm and well-perfused hand with palpable pulse    A/P:  Doing well  Advance per op report

## 2017-08-28 NOTE — LETTER
8/28/2017       RE: Shweta Pike  [address] [facility] [address]     Dear Colleague,    Thank you for referring your patient, Shweta Pike, to the [facility] at [facility]. Please see a copy of my visit note below.    S:  Doing well.    O:  Incision clean, dry, and intact  Sets Deltoid  Wiggles fingers  Warm and well-perfused hand with palpable pulse    A/P:  Doing well  Advance per op report      Again, thank you for allowing me to participate in the care of your patient.      Sincerely,    Quincy Hines MD

## 2017-08-28 NOTE — NURSING NOTE
"Reason For Visit:   Chief Complaint   Patient presents with     Surgical Followup     DOS 7/17/17 S/P Right Biceps Tenotomy Arthroscopic Subacromial Decompression. Right shoulder arthroscopic rotator cuff repair.     PCP: Nahed Olson  Ref: Same      ?  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  Date of injury: No specific injury  Type of injury: Overuse of shoulder from playing softball and volleyball   Date of surgery: 7/17/17  Type of surgery: Right biceps tenotomy arthroscopic subacromial decompression  Smoker: No          Left  hand dominant      SANE score  Affected shoulder: RIght  Right shoulder SANE: 10  Left shoulder SANE: 90    Ht 1.626 m (5' 4\")  Wt 71.5 kg (157 lb 11.2 oz)  BMI 27.07 kg/m2      Pain Assessment  Patient Currently in Pain: Yes  0-10 Pain Scale: 3  Primary Pain Location: Shoulder  Pain Orientation: Right  Pain Descriptors: Dull, Aching    Cayla Linton LPN      "

## 2017-08-28 NOTE — MR AVS SNAPSHOT
After Visit Summary   8/28/2017    Shweta Pike    MRN: [id number]           Patient Information     Date Of Birth          1980        Visit Information        Provider Department      8/28/2017 10:50 AM Quincy Hines MD [facility]        Today's Diagnoses     Right shoulder strain, initial encounter    -  1       Follow-ups after your visit        Additional Services     [facility] PT, HAND, AND CHIROPRACTIC REFERRAL       Progressive 4 quadrant stretching.  No strength or resistive.  Please teach and supervise home program (four times per day). OK to remove sling to do stretches. On 9/17/17 may discontinue the sling but no lifting more than 1 lb.                  Your next 10 appointments already scheduled     Aug 30, 2017 10:00 AM CDT   Ortho Treatment with Saloni Finn PT   [facility])    [address] [place] [id number]   [phone]            Sep 06, 2017 11:00 AM CDT   Ortho Treatment with Saloni Finn PT   [facility]    [address] [place] [address]   [phone]              Who to contact     Please call your clinic at [phone] to:    Ask questions about your health    Make or cancel appointments    Discuss your medicines    Learn about your test results    Speak to your doctor   If you have compliments or concerns about an experience at your clinic, or if you wish to file a complaint, please contact [facility] Patient Relations at [phone] or email us at [email or web address]         Additional Information About Your Visit        MyChart Information     [employer] gives you secure access to your electronic health record. If you see a primary care provider, you can also send messages to your care team and make appointments. If you have questions, please call your primary care  "clinic.  If you do not have a primary care provider, please call [phone] and they will assist you.      [employer] is an electronic gateway that provides easy, online access to your medical records. With [employer], you can request a clinic appointment, read your test results, renew a prescription or communicate with your care team.     To access your existing account, please contact your [facility] or call [phone] for assistance.        Care EveryWhere ID     This is your Care EveryWhere ID. This could be used by other organizations to access your [place] medical records  [id number]        Your Vitals Were     Height BMI (Body Mass Index)                1.626 m (5' 4\") 27.07 kg/m2           Blood Pressure from Last 3 Encounters:   08/02/17 102/62   07/17/17 114/78   07/06/17 126/80    Weight from Last 3 Encounters:   08/28/17 71.5 kg (157 lb 11.2 oz)   08/02/17 72.6 kg (160 lb)   07/31/17 73.9 kg (162 lb 14.4 oz)              We Performed the Following     [facility] PT, HAND, AND CHIROPRACTIC REFERRAL        Primary Care Provider Office Phone # Fax #    ORQUIDEA [facility] [phone]       [address] [place] [address]        Equal Access to Services     YOVANNY ALEJANDRO : Hadii aad ku hadasho Soomaali, waaxda luqadaha, qaybta kaalmada adeegyada, waxadrienne idiin hayaan marlin easton . So [facility] [phone].    ATENCIÓN: Si habla español, tiene a zamudio disposición servicios gratuitos de asistencia lingüística. Llame al [phone].    We comply with applicable federal civil rights laws and [place] laws. We do not discriminate on the basis of race, color, national origin, age, disability sex, sexual orientation or gender identity.            Thank you!     Thank you for choosing [facility]  for your care. Our goal is always to provide you with excellent care. Hearing back from our patients is one way we can continue to improve our services. " Please take a few minutes to complete the written survey that you may receive in the mail after your visit with us. Thank you!             Your Updated Medication List - Protect others around you: Learn how to safely use, store and throw away your medicines at www.disposemymeds.org.          This list is accurate as of: 8/28/17 11:44 AM.  Always use your most recent med list.                   Brand Name Dispense Instructions for use Diagnosis    HYDROcodone-acetaminophen 5-325 MG per tablet    NORCO    30 tablet    Take 1 tablet by mouth every 6 hours as needed for moderate to severe pain    Pain in joint of right shoulder       ibuprofen 800 MG tablet    ADVIL/MOTRIN    90 tablet    Take 1 tablet (800 mg) by mouth 3 times daily (with meals)    Pain in joint of right shoulder       methocarbamol 500 MG tablet    ROBAXIN    90 tablet    Take 2 tablets (1,000 mg) by mouth 3 times daily as needed for muscle spasms    Cervicalgia       propranolol 20 MG tablet    INDERAL    15 tablet    Take 1 tablet (20 mg) by mouth once as needed Take 60 minutes before testing. Can adjust up to 60 mg if HR and BP tolerating    Test anxiety

## 2017-08-30 ENCOUNTER — HOSPITAL ENCOUNTER (OUTPATIENT)
Dept: PHYSICAL THERAPY | Facility: CLINIC | Age: 37
Setting detail: THERAPIES SERIES
End: 2017-08-30
Attending: ORTHOPAEDIC SURGERY
Payer: COMMERCIAL

## 2017-08-30 PROCEDURE — 97110 THERAPEUTIC EXERCISES: CPT | Mod: GP | Performed by: PHYSICAL THERAPIST

## 2017-08-30 PROCEDURE — 40000718 ZZHC STATISTIC PT DEPARTMENT ORTHO VISIT: Performed by: PHYSICAL THERAPIST

## 2017-09-06 ENCOUNTER — HOSPITAL ENCOUNTER (OUTPATIENT)
Dept: PHYSICAL THERAPY | Facility: CLINIC | Age: 37
Setting detail: THERAPIES SERIES
End: 2017-09-06
Attending: ORTHOPAEDIC SURGERY
Payer: COMMERCIAL

## 2017-09-06 PROCEDURE — 97110 THERAPEUTIC EXERCISES: CPT | Mod: GP | Performed by: PHYSICAL THERAPIST

## 2017-09-06 PROCEDURE — 40000718 ZZHC STATISTIC PT DEPARTMENT ORTHO VISIT: Performed by: PHYSICAL THERAPIST

## 2017-09-15 ENCOUNTER — HOSPITAL ENCOUNTER (OUTPATIENT)
Dept: PHYSICAL THERAPY | Facility: CLINIC | Age: 37
Setting detail: THERAPIES SERIES
End: 2017-09-15
Attending: ORTHOPAEDIC SURGERY
Payer: COMMERCIAL

## 2017-09-15 PROCEDURE — 40000718 ZZHC STATISTIC PT DEPARTMENT ORTHO VISIT: Performed by: PHYSICAL THERAPIST

## 2017-09-15 PROCEDURE — 97110 THERAPEUTIC EXERCISES: CPT | Mod: GP | Performed by: PHYSICAL THERAPIST

## 2017-09-28 ENCOUNTER — HOSPITAL ENCOUNTER (OUTPATIENT)
Dept: PHYSICAL THERAPY | Facility: CLINIC | Age: 37
Setting detail: THERAPIES SERIES
End: 2017-09-28
Attending: ORTHOPAEDIC SURGERY
Payer: COMMERCIAL

## 2017-09-28 PROCEDURE — 40000718 ZZHC STATISTIC PT DEPARTMENT ORTHO VISIT: Performed by: PHYSICAL THERAPIST

## 2017-09-28 PROCEDURE — 97110 THERAPEUTIC EXERCISES: CPT | Mod: GP | Performed by: PHYSICAL THERAPIST

## 2017-09-28 PROCEDURE — 97140 MANUAL THERAPY 1/> REGIONS: CPT | Mod: GP | Performed by: PHYSICAL THERAPIST

## 2017-09-28 NOTE — PROGRESS NOTES
Outpatient Physical Therapy Progress Note     Patient: Shweta Pike  : 1980    Beginning/End Dates of Reporting Period:  8/3/17 to 2017    Referring Provider: Breanne Francis LPN     Therapy Diagnosis: weakness, limited ROM and pain s/p shoulder surgery     Client Self Report: Pt relates she is getting stronger and can do more activities however ROM is the same.     Objective Measurements:  Objective Measure: Flex  Details: 160 A: 130    Objective Measure: Abd in scaption   Details: 140 A: 130    Objective Measure: IR  Details: P: 20 at 90 deg abd A: S1    Objective Measure: ER  Details: 45 at 90 deg abd A: C5    Objective Measure: MMT  Details: flex 3/5 abd 4/5 IR 4/5 ER 3/5    Objective Measure: SPADI  Details: 25%     Goals:  Goal Identifier ROM   Goal Description Pt will demonstrate full  GH AROM with less than 1/10 pain in order to be able to don/doff jacket/shirt to return to PLOF w/o  pain.   Target Date 17   Date Met      Progress:not met see above     Goal Identifier MMT   Goal Description Pt will demonstrate 5/5 GH abd/add/ ER/IR in order to be able to return to PLOF and complete  work tasks    Target Date 17   Date Met      Progress:not met see above     Goal Identifier return to sport    Goal Description Pt will return full return to sport w less than 1/10 pain in R shoulder   Target Date 17   Date Met      Progress:not met due to post op precuations     Goal Identifier SPADI   Goal Description Pt will report <10% disability on SPADI to demonstrate improved ability to complete daily activities and demonstrate significant clinical improvement   Target Date 17   Date Met      Progress:significant improvement noted in SPADI score however it is not yet met       Progress Toward Goals:   Progress this reporting period: Pt has been seen for 8 visits. In that time, the focus has been on ROM per surgeon's post op protocol.  Pt is improving in AROM and PROM however  progress has been slow and pt feels very restricted in flex and IR. Despite not yet working on strengthening pt strength is improving through ADL's as seen above. SPADI score also demonstrated significant improvement. Plan to continue with ROM and initiate strengthening at 12 weeks per protocol.         Plan:  Continue therapy per current plan of care.    Discharge:   No    Saloni Finn  Physical Therapist  [address] [address]  [email or web address]   [email or web address]   Office: [phone] Fax: [phone]

## 2017-10-05 ENCOUNTER — HOSPITAL ENCOUNTER (OUTPATIENT)
Dept: PHYSICAL THERAPY | Facility: CLINIC | Age: 37
Setting detail: THERAPIES SERIES
End: 2017-10-05
Attending: ORTHOPAEDIC SURGERY
Payer: COMMERCIAL

## 2017-10-05 PROCEDURE — 97140 MANUAL THERAPY 1/> REGIONS: CPT | Mod: GP | Performed by: PHYSICAL THERAPIST

## 2017-10-05 PROCEDURE — 97110 THERAPEUTIC EXERCISES: CPT | Mod: GP | Performed by: PHYSICAL THERAPIST

## 2017-10-05 PROCEDURE — 40000718 ZZHC STATISTIC PT DEPARTMENT ORTHO VISIT: Performed by: PHYSICAL THERAPIST

## 2017-10-12 DIAGNOSIS — Z98.890 S/P RIGHT ROTATOR CUFF REPAIR: Primary | ICD-10-CM

## 2017-10-16 ENCOUNTER — OFFICE VISIT (OUTPATIENT)
Dept: ORTHOPEDICS | Facility: CLINIC | Age: 37
End: 2017-10-16

## 2017-10-16 VITALS — BODY MASS INDEX: 27.49 KG/M2 | HEIGHT: 64 IN | WEIGHT: 161 LBS

## 2017-10-16 DIAGNOSIS — S46.911S STRAIN OF RIGHT SHOULDER, SEQUELA: Primary | ICD-10-CM

## 2017-10-16 NOTE — LETTER
Return to Work  2017     Seen today: yes    Patient:  Shweta Pike  :   1980  MRN:     [id number]  Physician: MARK HINES    Shweta Pike may return to work on Date: 10/16/2017 with no restrictions.          Electronically signed by Mark Hines MD

## 2017-10-16 NOTE — LETTER
10/16/2017     RE: Shweta Pike  [address] [facility] [address]     Dear Colleague,    Thank you for referring your patient, Shweta Pike, to the [facility]THOP[facility]IC at [facility]. Please see a copy of my visit note below.    CHIEF COMPLAINT:  Right shoulder status post arthroscopic rotator cuff repair, biceps tenotomy, subacromial decompression.  Date of surgery:  07/17/2017.      HISTORY OF PRESENT ILLNESS:  Ms. Pike returns today for followup.  She is back effectively to all activities.  She has no complaints.  She notes that overall, she is very happy with how things have turned out on her shoulder.  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      RADIOGRAPHS:  AP and lateral of glenohumeral joint, AP and lateral of scapula, indication shoulder surgery, show no evidence of fracture, dislocation or abnormal calcific focus.      PHYSICAL EXAMINATION:  On exam today, she has 160 degrees of forward elevation, 160 degrees of lateral elevation, 60 degrees of external rotation at the side and internal rotation of the back to T-10.  She has 5/5 forward elevator and external rotator strength.      ASSESSMENT:  Status post above procedure, doing well.      PLAN:  I had a nice talk with Ms. Pike today.  I told her that she can increase her activities and see me back at the anniversary of her surgery with repeat radiographs.  She was given a note to return to work with no limitations.  She was advised if possible to avoid repetitive over shoulder activities and vigorous lifting until after the 6-month dominick.     Again, thank you for allowing me to participate in the care of your patient.      Sincerely,    Quincy Hines MD

## 2017-10-16 NOTE — NURSING NOTE
"Reason For Visit:   Chief Complaint   Patient presents with     Surgical Followup     DOS 7/17/17 S/P Right Biceps Tenotomy Arthroscopic Subacromial Decompression. Right shoulder arthroscopic rotator cuff repair.       PCP: Nahed Olson  Ref: Same      ?  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  Date of injury: No specific injury  Type of injury: Overuse of shoulder from playing softball and volleyball   Date of surgery: 7/17/17  Type of surgery: Right biceps tenotomy arthroscopic subacromial decompression  Smoker: No          Left  hand dominant    SANE score  Affected shoulder: RIght  Right shoulder SANE: 75  Left shoulder SANE: 85    Ht 1.626 m (5' 4\")  Wt 73 kg (161 lb)  BMI 27.64 kg/m2      Pain Assessment  Patient Currently in Pain: No    Cayla Linton LPN      "

## 2017-10-16 NOTE — PROGRESS NOTES
CHIEF COMPLAINT:  Right shoulder status post arthroscopic rotator cuff repair, biceps tenotomy, subacromial decompression.  Date of surgery:  07/17/2017.      HISTORY OF PRESENT ILLNESS:  Ms. Pike returns today for followup.  She is back effectively to all activities.  She has no complaints.  She notes that overall, she is very happy with how things have turned out on her shoulder.  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      RADIOGRAPHS:  AP and lateral of glenohumeral joint, AP and lateral of scapula, indication shoulder surgery, show no evidence of fracture, dislocation or abnormal calcific focus.      PHYSICAL EXAMINATION:  On exam today, she has 160 degrees of forward elevation, 160 degrees of lateral elevation, 60 degrees of external rotation at the side and internal rotation of the back to T-10.  She has 5/5 forward elevator and external rotator strength.      ASSESSMENT:  Status post above procedure, doing well.      PLAN:  I had a nice talk with Ms. Pike today.  I told her that she can increase her activities and see me back at the anniversary of her surgery with repeat radiographs.  She was given a note to return to work with no limitations.  She was advised if possible to avoid repetitive over shoulder activities and vigorous lifting until after the 6-month dominick.

## 2017-10-18 ENCOUNTER — HOSPITAL ENCOUNTER (OUTPATIENT)
Dept: PHYSICAL THERAPY | Facility: CLINIC | Age: 37
Setting detail: THERAPIES SERIES
End: 2017-10-18
Attending: ORTHOPAEDIC SURGERY
Payer: COMMERCIAL

## 2017-10-18 PROCEDURE — 40000718 ZZHC STATISTIC PT DEPARTMENT ORTHO VISIT: Performed by: PHYSICAL THERAPIST

## 2017-10-18 PROCEDURE — 97140 MANUAL THERAPY 1/> REGIONS: CPT | Mod: GP | Performed by: PHYSICAL THERAPIST

## 2017-10-18 PROCEDURE — 97110 THERAPEUTIC EXERCISES: CPT | Mod: GP | Performed by: PHYSICAL THERAPIST

## 2017-10-24 ENCOUNTER — OFFICE VISIT (OUTPATIENT)
Dept: FAMILY MEDICINE | Facility: CLINIC | Age: 37
End: 2017-10-24
Payer: COMMERCIAL

## 2017-10-24 VITALS
SYSTOLIC BLOOD PRESSURE: 112 MMHG | OXYGEN SATURATION: 98 % | HEART RATE: 112 BPM | DIASTOLIC BLOOD PRESSURE: 62 MMHG | RESPIRATION RATE: 14 BRPM | TEMPERATURE: 97.3 F

## 2017-10-24 DIAGNOSIS — G47.01 INSOMNIA DUE TO MEDICAL CONDITION: ICD-10-CM

## 2017-10-24 DIAGNOSIS — M54.2 CERVICALGIA: ICD-10-CM

## 2017-10-24 DIAGNOSIS — M25.511 PAIN IN JOINT OF RIGHT SHOULDER: ICD-10-CM

## 2017-10-24 PROCEDURE — 99214 OFFICE O/P EST MOD 30 MIN: CPT | Performed by: NURSE PRACTITIONER

## 2017-10-24 RX ORDER — IBUPROFEN 800 MG/1
800 TABLET, FILM COATED ORAL
Qty: 180 TABLET | Refills: 1 | Status: SHIPPED | OUTPATIENT
Start: 2017-10-24 | End: 2019-03-15

## 2017-10-24 RX ORDER — TRAZODONE HYDROCHLORIDE 100 MG/1
TABLET ORAL
Qty: 180 TABLET | Refills: 0 | Status: CANCELLED | OUTPATIENT
Start: 2017-10-24

## 2017-10-24 RX ORDER — METHOCARBAMOL 500 MG/1
1000 TABLET, FILM COATED ORAL 3 TIMES DAILY PRN
Qty: 180 TABLET | Refills: 1 | Status: SHIPPED | OUTPATIENT
Start: 2017-10-24 | End: 2018-06-20

## 2017-10-24 RX ORDER — TRAZODONE HYDROCHLORIDE 100 MG/1
200 TABLET ORAL AT BEDTIME
Qty: 180 TABLET | Refills: 1 | Status: SHIPPED | OUTPATIENT
Start: 2017-10-24 | End: 2018-06-20

## 2017-10-24 NOTE — PATIENT INSTRUCTIONS
General recommendations for sleep problems (Insomnia)   Allow 2-4 weeks to see results   Establish a regular sleep schedule   Go to bed at same time each night (when you think you can sleep)   Get up at same time each day   Avoid sleeping-in on Sunday morning   Cut down time in bed (if not asleep, get up)   Avoid trying to force yourself to sleep   Use your bed only for sleep and sex   Do not read or watch Television in bed   Make the bedroom comfortable   Keep temperature in your bedroom comfortable   Keep bedroom quiet when sleeping   Consider ear plugs (silicon)   Keep bedroom dark enough   Use dark blinds or wear an eye mask if needed   Relax at bedtime   Relax each muscle group individually   Begin with your feet   Work toward your head   Deal with your worries before bedtime   Set aside a worry time for 30 minutes earlier   Listen to relaxation tapes   Classical Music or Nature sounds   Imagine a tranquil scene (e.g. waterfall or beach)   Back Massage   Gentle 5-minute back rub prior to bedtime   Perform measures to make you tired at bedtime   Get regular Exercise each day (6 hours before bedtime)   Take medications only as directed   Eat a light bedtime snack or warm drink   Warm milk   Warm herbal tea (non-caffeinated)   Special Therapy Measures   Sleep Restriction Therapy   Limit time in bed for 15 minutes more than sleep   Do not set limit under 4 hours   Increase time by 15 minutes per effective sleep trial   Effective sleep suggests >90% of bedtime asleep   Stimulus Control   Do not lie awake for more than 30 minutes   Get out of bed and perform a quiet activity   Return to bed when sleepy   Repeat as many times per night as needed   No Napping during day   Things to avoid   Do not Exercise just before bedtime   No overstimulating activities just before bed   No competitive games before bedtime   No exciting Television programs before bedtime   Avoid caffeine after lunchtime   Avoid chocolate   Avoid  coffee, tea, or soda   Do not use alcohol to induce sleep (worsens Insomnia)   Do not take someone else's sleeping pills   Do not look at the clock when awakening   Do not turn on light when getting up to use bathroom   Read the book   Say Good Night To Insomnia

## 2017-10-24 NOTE — NURSING NOTE
"Chief Complaint   Patient presents with     Insomnia     recheck        Initial There were no vitals taken for this visit. Estimated body mass index is 27.64 kg/(m^2) as calculated from the following:    Height as of 10/16/17: 5' 4\" (1.626 m).    Weight as of 10/16/17: 161 lb (73 kg).  Medication Reconciliation: complete    Health Maintenance that is potentially due pending provider review:  ACT    n/a    Is there anyone who you would like to be able to receive your results? No  If yes have patient fill out NASIR    "

## 2017-10-24 NOTE — MR AVS SNAPSHOT
After Visit Summary   10/24/2017    Shweta Pike    MRN: [id number]           Patient Information     Date Of Birth          1980        Visit Information        Provider Department      10/24/2017 8:20 AM Nahed Olson APRN [facility]        Today's Diagnoses     Persistent insomnia        Cervicalgia        Pain in joint of right shoulder        Insomnia due to medical condition          Care Instructions    General recommendations for sleep problems (Insomnia)   Allow 2-4 weeks to see results   Establish a regular sleep schedule   Go to bed at same time each night (when you think you can sleep)   Get up at same time each day   Avoid sleeping-in on Sunday morning   Cut down time in bed (if not asleep, get up)   Avoid trying to force yourself to sleep   Use your bed only for sleep and sex   Do not read or watch Television in bed   Make the bedroom comfortable   Keep temperature in your bedroom comfortable   Keep bedroom quiet when sleeping   Consider ear plugs (silicon)   Keep bedroom dark enough   Use dark blinds or wear an eye mask if needed   Relax at bedtime   Relax each muscle group individually   Begin with your feet   Work toward your head   Deal with your worries before bedtime   Set aside a worry time for 30 minutes earlier   Listen to relaxation tapes   Classical Music or Nature sounds   Imagine a tranquil scene (e.g. waterfall or beach)   Back Massage   Gentle 5-minute back rub prior to bedtime   Perform measures to make you tired at bedtime   Get regular Exercise each day (6 hours before bedtime)   Take medications only as directed   Eat a light bedtime snack or warm drink   Warm milk   Warm herbal tea (non-caffeinated)   Special Therapy Measures   Sleep Restriction Therapy   Limit time in bed for 15 minutes more than sleep   Do not set limit under 4 hours   Increase time by 15 minutes per effective sleep trial   Effective sleep suggests >90% of bedtime  asleep   Stimulus Control   Do not lie awake for more than 30 minutes   Get out of bed and perform a quiet activity   Return to bed when sleepy   Repeat as many times per night as needed   No Napping during day   Things to avoid   Do not Exercise just before bedtime   No overstimulating activities just before bed   No competitive games before bedtime   No exciting Television programs before bedtime   Avoid caffeine after lunchtime   Avoid chocolate   Avoid coffee, tea, or soda   Do not use alcohol to induce sleep (worsens Insomnia)   Do not take someone else's sleeping pills   Do not look at the clock when awakening   Do not turn on light when getting up to use bathroom   Read the book   Say Good Night To Insomnia             Follow-ups after your visit        Who to contact     If you have questions or need follow up information about today's clinic visit or your schedule please contact [facility] [place]note 2] directly at [phone].  Normal or non-critical lab and imaging results will be communicated to you by Econ[employer]mhart, letter or phone within 4 business days after the clinic has received the results. If you do not hear from us within 7 days, please contact the clinic through [employer] or phone. If you have a critical or abnormal lab result, we will notify you by phone as soon as possible.  Submit refill requests through [facility] [employer] or call your pharmacy and they will forward the refill request to us. Please allow 3 business days for your refill to be completed.          Additional Information About Your Visit        [facility] Information     [facility] gives you secure access to your electronic health record. If you see a primary care provider, you can also send messages to your care team and make appointments. If you have questions, please call your primary care clinic.  If you do not have a primary care provider, please call [phone] and they will assist you.        Care EveryWhere ID     This is your Care  EveryWhere ID. This could be used by other organizations to access your [facility] medical records  [id number]        Your Vitals Were     Pulse Temperature Respirations Pulse Oximetry          112 97.3  F (36.3  C) (Tympanic) 14 98%         Blood Pressure from Last 3 Encounters:   10/24/17 112/62   08/02/17 102/62   07/17/17 114/78    Weight from Last 3 Encounters:   10/16/17 161 lb (73 kg)   08/28/17 157 lb 11.2 oz (71.5 kg)   08/02/17 160 lb (72.6 kg)              Today, you had the following     No orders found for display         Today's Medication Changes          These changes are accurate as of: 10/24/17  8:55 AM.  If you have any questions, ask your nurse or doctor.               Start taking these medicines.        Dose/Directions    traZODone 100 MG tablet   Commonly known as:  DESYREL   Used for:  Insomnia due to medical condition   Started by:  Nahed Olson APRN CNP        Dose:  200 mg   Take 2 tablets (200 mg) by mouth At Bedtime   Quantity:  180 tablet   Refills:  1         Stop taking these medicines if you haven't already. Please contact your care team if you have questions.     HYDROcodone-acetaminophen 5-325 MG per tablet   Commonly known as:  NORCO   Stopped by:  Nahed Olson APRN CNP                Where to get your medicines      These medications were sent to [facility] [address] [address]     Phone:  [phone]     ibuprofen 800 MG tablet    methocarbamol 500 MG tablet    traZODone 100 MG tablet                Primary Care Provider Office Phone # Fax #    ORQUIDEA Aleman [phone]       [address] [place] [address]        Equal Access to Services     HAROON ALEJANDRO : Grey Moran, waaxda luqadaha, qaybta kaalmada jovani, kaden escobedo. So [facility] [phone].    ATENCIÓN: Si habla español, tiene a zamudio disposición servicios  barron de asistencia lingüística. Dianna quijano [phone].    We comply with applicable federal civil rights laws and [place] laws. We do not discriminate on the basis of race, color, national origin, age, disability, sex, sexual orientation, or gender identity.            Thank you!     Thank you for choosing [facility]  for your care. Our goal is always to provide you with excellent care. Hearing back from our patients is one way we can continue to improve our services. Please take a few minutes to complete the written survey that you may receive in the mail after your visit with us. Thank you!             Your Updated Medication List - Protect others around you: Learn how to safely use, store and throw away your medicines at www.disposemymeds.org.          This list is accurate as of: 10/24/17  8:55 AM.  Always use your most recent med list.                   Brand Name Dispense Instructions for use Diagnosis    ibuprofen 800 MG tablet    ADVIL/MOTRIN    180 tablet    Take 1 tablet (800 mg) by mouth 3 times daily (with meals)    Pain in joint of right shoulder       methocarbamol 500 MG tablet    ROBAXIN    180 tablet    Take 2 tablets (1,000 mg) by mouth 3 times daily as needed for muscle spasms    Cervicalgia       propranolol 20 MG tablet    INDERAL    15 tablet    Take 1 tablet (20 mg) by mouth once as needed Take 60 minutes before testing. Can adjust up to 60 mg if HR and BP tolerating    Test anxiety       traZODone 100 MG tablet    DESYREL    180 tablet    Take 2 tablets (200 mg) by mouth At Bedtime    Insomnia due to medical condition

## 2017-10-24 NOTE — PROGRESS NOTES
SUBJECTIVE:   Shweta Pike is a 37 year old female who presents to clinic today for the following health issues:    Insomnia      Duration: ongoing for years worse after Right Shoulder surgery     Description  Frequency of insomnia:  nightly  Time to fall asleep:  Hours   Middle of night awakening:  nightly  Early morning awakening:  several times a week    Accompanying signs and symptoms:  none    History  Similar episodes in past:  YES  Previous evaluation/sleep study:  no     Precipitating or alleviating factors:  New stressful situation: YES- new job and surgery   Caffeine intake after lunchtime: no   OTC decongestants: no   Any new medications: no     Therapies tried and outcome: Trazodone in the past     Range of motion to the right shoulder is much improved.   Going to Physical Therapy one day a week.   Off all restrictions now from Ortho.  Surgery 7/17/2017  Ok'd to go back to work.     Starting new job and transitioning to her new insurance.     Needs refills on her meds.   Taking trazadone for sleep   Methocarbamol isn't enough so has been taking them together.     -------------------------------------    Problem list and histories reviewed & adjusted, as indicated.  Additional history: as documented    Labs reviewed in EPIC    Reviewed and updated as needed this visit by clinical staffTobacco  Allergies       Reviewed and updated as needed this visit by Provider         ROS:   ROS: 10 point ROS neg other than the symptoms noted above in the HPI.      OBJECTIVE:                                                    /62  Pulse 112  Temp 97.3  F (36.3  C) (Tympanic)  Resp 14  SpO2 98%  There is no height or weight on file to calculate BMI.   GENERAL: healthy, alert, well nourished, well hydrated, no distress  HENT: ear canals- normal; TMs- normal; Nose- normal; Mouth- no ulcers, no lesions  NECK: no tenderness, no adenopathy, no asymmetry, no masses, no stiffness; thyroid- normal to  palpation  RESP: lungs clear to auscultation - no rales, no rhonchi, no wheezes  CV: regular rates and rhythm, normal S1 S2, no S3 or S4 and no murmur, no click or rub -  ABDOMEN: soft, no tenderness, no  hepatosplenomegaly, no masses, normal bowel sounds  MS: extremities- no gross deformities noted, no edema limited range of motion to the right shoulder. Pain with palpation over the right trapezius muscle grasp is strong and equal    Diagnostic test results:  none        ASSESSMENT/PLAN:                                                    1. Persistent insomnia    4. Insomnia due to medical condition   sleep hygiene reviewed    - traZODone (DESYREL) 100 MG tablet; Take 2 tablets (200 mg) by mouth At Bedtime  Dispense: 180 tablet; Refill: 1      2. Cervicalgia  Stretching exercises are reviewed. May use muscle relaxant as needed at h.s.  - methocarbamol (ROBAXIN) 500 MG tablet; Take 2 tablets (1,000 mg) by mouth 3 times daily as needed for muscle spasms  Dispense: 180 tablet; Refill: 1    3. Pain in joint of right shoulder  Improving  Continue NSAID  - ibuprofen (ADVIL/MOTRIN) 800 MG tablet; Take 1 tablet (800 mg) by mouth 3 times daily (with meals)  Dispense: 180 tablet; Refill: 1          Follow up with Provider - Call or return to the clinic with any worsening of symptoms or no resolution. Patient/Parent verbalized understanding and is in agreement. Medication side effects reviewed.   Current Outpatient Prescriptions   Medication Sig Dispense Refill     methocarbamol (ROBAXIN) 500 MG tablet Take 2 tablets (1,000 mg) by mouth 3 times daily as needed for muscle spasms 180 tablet 1     ibuprofen (ADVIL/MOTRIN) 800 MG tablet Take 1 tablet (800 mg) by mouth 3 times daily (with meals) 180 tablet 1     traZODone (DESYREL) 100 MG tablet Take 2 tablets (200 mg) by mouth At Bedtime 180 tablet 1     propranolol (INDERAL) 20 MG tablet Take 1 tablet (20 mg) by mouth once as needed Take 60 minutes before testing. Can adjust up  to 60 mg if HR and BP tolerating 15 tablet 0        See Patient Instructions    ORQUIDEA Aleman CNP  [facility]

## 2017-10-25 ASSESSMENT — ASTHMA QUESTIONNAIRES: ACT_TOTALSCORE: 25

## 2018-03-15 NOTE — PROGRESS NOTES
Outpatient Physical Therapy Discharge Note     Patient: Shweta Pike  : 1980    Beginning/End Dates of Reporting Period:  18 to 10/18/2017    Referring Provider: Breanne Francis LPN      Therapy Diagnosis: weakness, limited ROM and pain s/p shoulder surgery  Client Self Report: Pt relates shoulder is doing well however she is getting  stiff at top of shoulder. MD gave her IR stretches and is cleared to go back to work.     Objective Measurements:  Objective Measure: Flex  Details: 160 A: 130    Objective Measure: Abd in scaption   Details: 140 A: 130    Objective Measure: IR  Details: P: 20 at 90 deg abd A: S1    Objective Measure: ER  Details: 70 at 90 deg abd A: C5    Objective Measure: MMT  Details: flex 3/5 abd 4/5 IR 4/5 ER 3/5    Objective Measure: SPADI  Details: 25%     Goals:  Goal Identifier ROM   Goal Description Pt will demonstrate full  GH AROM with less than 1/10 pain in order to be able to don/doff jacket/shirt to return to PLOF w/o  pain.   Target Date 17   Date Met      Progress:not met     Goal Identifier MMT   Goal Description Pt will demonstrate 5/5 GH abd/add/ ER/IR in order to be able to return to PLOF and complete  work tasks    Target Date 17   Date Met      Progress:not met     Goal Identifier return to sport    Goal Description Pt will return full return to sport w less than 1/10 pain in R shoulder   Target Date 17   Date Met      Progress:not  met     Goal Identifier SPADI   Goal Description Pt will report <10% disability on SPADI to demonstrate improved ability to complete daily activities and demonstrate significant clinical improvement   Target Date 17   Date Met      Progress:not met     Progress Toward Goals:   Progress this reporting period: Pt has been seen for 10 visits over this POC. At time of last visit strengthening was due initiated per protocol and pt was to return in 2 weeks to continue to progress. Pt did not return with in 30  days thus is being d/c at this time. Current status is unknown.           Plan:  Discharge from therapy.    Discharge:    Reason for Discharge: Patient has failed to schedule further appointments.    Equipment Issued: NA    Discharge Plan: Patient to continue home program.    Saloni Finn  Physical Therapist  [address] [address]  [email or web address]   [email or web address]   Office: [phone] Fax: [phone]

## 2018-06-20 DIAGNOSIS — M54.2 CERVICALGIA: ICD-10-CM

## 2018-06-20 DIAGNOSIS — G47.01 INSOMNIA DUE TO MEDICAL CONDITION: ICD-10-CM

## 2018-06-20 RX ORDER — METHOCARBAMOL 500 MG/1
1000 TABLET, FILM COATED ORAL 3 TIMES DAILY PRN
Qty: 180 TABLET | Refills: 1 | Status: SHIPPED | OUTPATIENT
Start: 2018-06-20 | End: 2019-03-08

## 2018-06-20 RX ORDER — TRAZODONE HYDROCHLORIDE 100 MG/1
200 TABLET ORAL AT BEDTIME
Qty: 180 TABLET | Refills: 1 | Status: SHIPPED | OUTPATIENT
Start: 2018-06-20 | End: 2019-03-08

## 2019-03-08 ENCOUNTER — OFFICE VISIT (OUTPATIENT)
Dept: FAMILY MEDICINE | Facility: CLINIC | Age: 39
End: 2019-03-08
Payer: COMMERCIAL

## 2019-03-08 VITALS
BODY MASS INDEX: 28.85 KG/M2 | DIASTOLIC BLOOD PRESSURE: 62 MMHG | SYSTOLIC BLOOD PRESSURE: 118 MMHG | OXYGEN SATURATION: 99 % | RESPIRATION RATE: 14 BRPM | WEIGHT: 169 LBS | HEART RATE: 72 BPM | HEIGHT: 64 IN

## 2019-03-08 DIAGNOSIS — M54.2 CERVICALGIA: ICD-10-CM

## 2019-03-08 DIAGNOSIS — G47.01 INSOMNIA DUE TO MEDICAL CONDITION: ICD-10-CM

## 2019-03-08 DIAGNOSIS — R41.840 ATTENTION AND CONCENTRATION DEFICIT: Primary | ICD-10-CM

## 2019-03-08 PROCEDURE — 99214 OFFICE O/P EST MOD 30 MIN: CPT | Performed by: NURSE PRACTITIONER

## 2019-03-08 RX ORDER — TRAZODONE HYDROCHLORIDE 100 MG/1
200 TABLET ORAL AT BEDTIME
Qty: 180 TABLET | Refills: 1 | Status: SHIPPED | OUTPATIENT
Start: 2019-03-08 | End: 2019-09-27

## 2019-03-08 RX ORDER — TRAZODONE HYDROCHLORIDE 100 MG/1
200 TABLET ORAL AT BEDTIME
Qty: 180 TABLET | Refills: 0 | Status: SHIPPED | OUTPATIENT
Start: 2019-03-08 | End: 2019-03-08

## 2019-03-08 RX ORDER — METHOCARBAMOL 500 MG/1
1000 TABLET, FILM COATED ORAL 3 TIMES DAILY PRN
Qty: 180 TABLET | Refills: 1 | Status: SHIPPED | OUTPATIENT
Start: 2019-03-08 | End: 2019-11-26

## 2019-03-08 RX ORDER — METHOCARBAMOL 500 MG/1
1000 TABLET, FILM COATED ORAL 3 TIMES DAILY PRN
Qty: 180 TABLET | Refills: 0 | Status: SHIPPED | OUTPATIENT
Start: 2019-03-08 | End: 2019-03-08

## 2019-03-08 ASSESSMENT — PAIN SCALES - GENERAL: PAINLEVEL: NO PAIN (0)

## 2019-03-08 ASSESSMENT — MIFFLIN-ST. JEOR: SCORE: 1428.64

## 2019-03-08 NOTE — PROGRESS NOTES
"  SUBJECTIVE:   Shweta Pike is a 38 year old female who presents to clinic today for the following health issues:    Robaxin for sleep and 1-2 trazadone at night for sleep.   Helps with the sleep due to her chronic   Increase in trazodone causes her to be really groggy.   Shoulder pain occasionally painful depending on activity and glad she had it done.   Able to be more active after her surgery.     Medication Followup of Trazodone and robaxin   She uses this for her chronic ongoing shoulder pain    Taking Medication as prescribed: yes    Side Effects:  None    Medication Helping Symptoms:  yes       Consult in ADD     Duration: ongoing for years   A lot of symptoms in early childhood  Feels like she is always loosing things.   Has OCD. Hard to concentrate.   Start multiple projects and having a hard time finishing them    Caffeine intake minimal .. Stopped Mt Dew.   No energy drinks.        Problem list and histories reviewed & adjusted, as indicated.  Additional history: as documented    Labs reviewed in EPIC    Reviewed and updated as needed this visit by clinical staff       Reviewed and updated as needed this visit by Provider         ROS:   ROS: 10 point ROS neg other than the symptoms noted above in the HPI.      OBJECTIVE:                                                    /62   Pulse 72   Resp 14   Ht 1.613 m (5' 3.5\")   Wt 76.7 kg (169 lb)   SpO2 99%   BMI 29.47 kg/m    Body mass index is 29.47 kg/m .   GENERAL: healthy, alert, well nourished, well hydrated, no distress  HENT: ear canals- normal; TMs- normal; Nose- normal; Mouth- no ulcers, no lesions  NECK: no tenderness, no adenopathy, no asymmetry, no masses, no stiffness; thyroid- normal to palpation  RESP: lungs clear to auscultation - no rales, no rhonchi, no wheezes  CV: regular rates and rhythm, normal S1 S2, no S3 or S4 and no murmur, no click or rub -  ABDOMEN: soft, no tenderness, no  hepatosplenomegaly, no masses, normal bowel " sounds    Diagnostic test results:  Results for orders placed or performed in visit on 10/16/17   XR Shoulder Right 2 Views    Narrative    Exam: 4 views of the right shoulder dated 10/16/2017.    COMPARISON: MRI dated 8/21/2017.    CLINICAL HISTORY: Postprocedure evaluation.    FINDINGS: Neutral, Grashey, axillary and Y views of the right shoulder  were obtained. No evidence of fracture or dislocation involving the  right shoulder. The acromioclavicular joint is well aligned.      Impression    IMPRESSION: No evidence of fracture or dislocation involving the right  shoulder.    ROBERT BORRERO MD        ASSESSMENT/PLAN:                                                    1. Insomnia due to medical condition  Continue trazodone  - traZODone (DESYREL) 100 MG tablet; Take 2 tablets (200 mg) by mouth At Bedtime  Dispense: 180 tablet; Refill: 1    2. Cervicalgia  Refilled medications today  - methocarbamol (ROBAXIN) 500 MG tablet; Take 2 tablets (1,000 mg) by mouth 3 times daily as needed for muscle spasms  Dispense: 180 tablet; Refill: 1    3. Attention and concentration deficit  Formal testing recommended  - MENTAL HEALTH REFERRAL  - Adult; Assessments and Testing; ADHD; Other: [facility] [phone]; We will contact you to schedule the appointment or please call with any questions  Follow-up after testing    Follow up with Provider - Call or return to the clinic with any worsening of symptoms or no resolution. Patient/Parent verbalized understanding and is in agreement. Medication side effects reviewed.   Current Outpatient Medications   Medication Sig Dispense Refill     ibuprofen (ADVIL/MOTRIN) 800 MG tablet Take 1 tablet (800 mg) by mouth 3 times daily (with meals) 180 tablet 1     methocarbamol (ROBAXIN) 500 MG tablet Take 2 tablets (1,000 mg) by mouth 3 times daily as needed for muscle spasms 180 tablet 1     traZODone (DESYREL) 100 MG tablet Take 2 tablets (200 mg) by mouth At Bedtime 180  tablet 1        See Patient Instructions  Chart documentation with Dragon Voice recognition Software. Although reviewed after completion, some words and grammatical errors may remain.      ORQUIDEA Aleman [facility]

## 2019-03-08 NOTE — PATIENT INSTRUCTIONS
Patient Education     Attention-Deficit/Hyperactivity Disorder (ADHD) in Adults  You ve always had trouble concentrating. Your mind wanders, and it s hard to finish tasks. As a result, you didn t do well in school. And now, you often struggle with your job. Sometimes this makes you esteban or depressed. These may be symptoms of attention-deficit/hyperactivity disorder (ADHD). To find out more, talk to your healthcare provider. He or she can offer guidance and support.  Symptoms  of ADHD in adults  For an adult to be diagnosed with ADHD, the symptoms must have been present since childhood. The symptoms may include:    Trouble thinking things through    Low self-esteem    Depression    Trouble holding a job    Memory problems    Problems with a marriage or relationship    Lack of discipline   What is ADHD?  Attention-deficit/hyperactivity disorder makes it hard to focus your mind. You may daydream a lot. And you may be restless much of the time. As a result, you may have trouble with detailed or boring work. And it may be hard to stick with one project for very long. You also may forget things. Or, you may miss key points during a lecture or meeting. You may even have trouble sitting through a movie or concert. At times, you may feel frustrated or angry. This can affect your relationships with others.  Who does it affect?  ADHD starts in childhood. Sometimes, your symptoms may improve as you get older. But they also may persist into your adult years. ADHD is often thought of as a  kid s problem.  That s why it s often missed in adults. In fact, many parents learn they have ADHD when their children are diagnosed.  What causes it?  The exact cause of ADHD isn t known. The disorder does run in families. Having one parent with ADHD makes it more likely you ll have it too. And the part of your brain that controls attention may be involved. Certain brain chemicals that are out of balance may also play a role.  What can be  done?  The first step is finding out if you really have ADHD. Your doctor will use special guidelines to diagnose the disorder. Most adults with ADHD are greatly helped by therapy and coaching. In some cases, your doctor may also prescribe medicine to ease your symptoms.  Resources    National Resource Center on AD/HDwww.rmfy0ehlb.org    Attention Deficit Disorder Associationwww.add.org   Date Last Reviewed: 1/1/2017 2000-2018 [employer]. [address] [place] [address]. All rights reserved. This information is not intended as a substitute for professional medical care. Always follow your healthcare professional's instructions.           Patient Education     Treating Attention-Deficit/Hyperactivity Disorder (ADHD) in Adults  Attention-deficit/hyperactivity disorder (ADHD) starts in childhood. It may continue throughout your life. When it does, it may affect your job and even your relationships. Fortunately, with help, you can manage ADHD.     Treatment for ADHD can help you achieve your goals.   Therapy  Your therapist can help you learn healthy ways to cope with ADHD. Sometimes, your partner or family may attend your sessions with you. This helps them understand more about your disorder.  Coaching  An ADHD  works with you to achieve your goals. You ll learn the best ways to manage your time. You ll also learn to avoid clutter and noise that may distract you. In time, your life will have more order and structure. And your  will provide support and feedback on your progress.  Work  Look for jobs where you can be free and creative. Stay away from those that are dull or centered on details. You may still need to make a special effort. The following tips may help:    Try to work at home, at least part time.    Ask for a private office.    Use headphones to muffle noise.    Work on more than one project at the same time. When you get bored with one, switch to the other.    Work on  boring tasks when you feel most alert.    Have a schedule for each day.    Ask your  or  to help with details.    Use a day planner and to-do lists. Write yourself notes.    Reward yourself when you finish a task.  Medicines  In some cases, medicines can help control symptoms of ADHD. Most often, you'll use medicine along with therapy, coaching, and lifestyle changes. Your healthcare provider may prescribe a stimulant to help you stay focused. Or you may take a type of antidepressant. It may take some time to find what works best for you. Keep in mind that medicines don t cure ADHD. And they may cause side effects such as headaches, trouble sleeping, or stomach problems. Take your medicine as prescribed. If you re bothered by side effects, be sure to tell your healthcare provider.  Always talk with your healthcare provider before making any changes to your medicine.  Date Last Reviewed: 1/1/2017 2000-2018 [employer]. [address] [place] [address], [place] [address]. All rights reserved. This information is not intended as a substitute for professional medical care. Always follow your healthcare professional's instructions.

## 2019-03-15 DIAGNOSIS — M25.511 PAIN IN JOINT OF RIGHT SHOULDER: ICD-10-CM

## 2019-03-15 RX ORDER — IBUPROFEN 800 MG/1
800 TABLET, FILM COATED ORAL
Qty: 180 TABLET | Refills: 1 | Status: SHIPPED | OUTPATIENT
Start: 2019-03-15

## 2019-03-15 ASSESSMENT — ASTHMA QUESTIONNAIRES: ACT_TOTALSCORE: 24

## 2019-03-15 NOTE — LETTER
My Asthma Action Plan  Name: Shweta Pike   YOB: 1980  Date: 3/15/2019   My doctor: ORQUIDEA Aleman CNP   My clinic: [facility]         My Asthma Severity: mild persistent  Avoid your asthma triggers: Patient is unaware of triggers             GREEN ZONE   Good Control    I feel good    No cough or wheeze    Can work, sleep and play without asthma symptoms       Take your asthma control medicine every day.     1. If exercise triggers your asthma, take your rescue medication    15 minutes before exercise or sports, and    During exercise if you have asthma symptoms  2. Spacer to use with inhaler: If you have a spacer, make sure to use it with your inhaler             YELLOW ZONE Getting Worse  I have ANY of these:    I do not feel good    Cough or wheeze    Chest feels tight    Wake up at night   1. Keep taking your Green Zone medications  2. Start taking your rescue medicine:    every 20 minutes for up to 1 hour. Then every 4 hours for 24-48 hours.  3. If you stay in the Yellow Zone for more than 12-24 hours, contact your doctor.  4. If you do not return to the Green Zone in 12-24 hours or you get worse, start taking your oral steroid medicine if prescribed by your provider.           RED ZONE Medical Alert - Get Help  I have ANY of these:    I feel awful    Medicine is not helping    Breathing getting harder    Trouble walking or talking    Nose opens wide to breathe       1. Take your rescue medicine NOW  2. If your provider has prescribed an oral steroid medicine, start taking it NOW  3. Call your doctor NOW  4. If you are still in the Red Zone after 20 minutes and you have not reached your doctor:    Take your rescue medicine again and    Call 911 or go to the emergency room right away    See your regular doctor within 2 weeks of an Emergency Room or Urgent Care visit for follow-up treatment.          Annual Reminders:  Meet with Asthma Educator,  Flu Shot in the  Fall, consider Pneumonia Vaccination for patients with asthma (aged 19 and older).    Pharmacy:    [facility], [place] - [address] [place] [address] DRUG STORE [address] [place] [address] [address] AT [facility] & [address]                      Asthma Triggers  How To Control Things That Make Your Asthma Worse    Triggers are things that make your asthma worse.  Look at the list below to help you find your triggers and what you can do about them.  You can help prevent asthma flare-ups by staying away from your triggers.      Trigger                                                          What you can do   Cigarette Smoke  Tobacco smoke can make asthma worse. Do not allow smoking in your home, car or around you.  Be sure no one smokes at a child s day care or school.  If you smoke, ask your health care provider for ways to help you quit.  Ask family members to quit too.  Ask your health care provider for a referral to Quit Plan to help you quit smoking, or call 6-005-373-PLAN.     Colds, Flu, Bronchitis  These are common triggers of asthma. Wash your hands often.  Don t touch your eyes, nose or mouth.  Get a flu shot every year.     Dust Mites  These are tiny bugs that live in cloth or carpet. They are too small to see. Wash sheets and blankets in hot water every week.   Encase pillows and mattress in dust mite proof covers.  Avoid having carpet if you can. If you have carpet, vacuum weekly.   Use a dust mask and HEPA vacuum.   Pollen and Outdoor Mold  Some people are allergic to trees, grass, or weed pollen, or molds. Try to keep your windows closed.  Limit time out doors when pollen count is high.   Ask you health care provider about taking medicine during allergy season.     Animal Dander  Some people are allergic to skin flakes, urine or saliva from pets with fur or feathers. Keep pets with fur or feathers out of your home.    If you can t keep the pet outdoors, then keep  the pet out of your bedroom.  Keep the bedroom door closed.  Keep pets off cloth furniture and away from stuffed toys.     Mice, Rats, and Cockroaches  Some people are allergic to the waste from these pests.   Cover food and garbage.  Clean up spills and food crumbs.  Store grease in the refrigerator.   Keep food out of the bedroom.   Indoor Mold  This can be a trigger if your home has high moisture. Fix leaking faucets, pipes, or other sources of water.   Clean moldy surfaces.  Dehumidify basement if it is damp and smelly.   Smoke, Strong Odors, and Sprays  These can reduce air quality. Stay away from strong odors and sprays, such as perfume, powder, hair spray, paints, smoke incense, paint, cleaning products, candles and new carpet.   Exercise or Sports  Some people with asthma have this trigger. Be active!  Ask your doctor about taking medicine before sports or exercise to prevent symptoms.    Warm up for 5-10 minutes before and after sports or exercise.     Other Triggers of Asthma  Cold air:  Cover your nose and mouth with a scarf.  Sometimes laughing or crying can be a trigger.  Some medicines and food can trigger asthma.

## 2019-03-16 ASSESSMENT — ASTHMA QUESTIONNAIRES: ACT_TOTALSCORE: 25

## 2019-03-18 ENCOUNTER — TELEPHONE (OUTPATIENT)
Dept: FAMILY MEDICINE | Facility: CLINIC | Age: 39
End: 2019-03-18

## 2019-03-18 NOTE — TELEPHONE ENCOUNTER
----- Message from Stacey Dwyer sent at 3/18/2019 10:00 AM CDT -----  Regarding: MENTAL HEALTH ORDER  Several attempts have been made to contact the patient. A letter has been sent advising the patient to contact [facility].    Stacey Dwyer  Intake Assistant, [facility]

## 2019-06-24 DIAGNOSIS — M54.2 CERVICALGIA: ICD-10-CM

## 2019-06-24 RX ORDER — METHOCARBAMOL 500 MG/1
TABLET, FILM COATED ORAL
Qty: 180 TABLET | Refills: 0 | Status: SHIPPED | OUTPATIENT
Start: 2019-06-24 | End: 2019-09-27

## 2019-06-24 NOTE — TELEPHONE ENCOUNTER
methocarbamol (ROBAXIN) 500 MG tablet      Last Written Prescription Date:  3/18/19  Last Fill Quantity: 180,   # refills: 1  Last Office Visit: 3/8/19  Future Office visit:       Routing refill request to provider for review/approval because:  Drug not on the [facility], [facility] or [facility] refill protocol or controlled substance

## 2019-07-24 NOTE — PROGRESS NOTES
Per Chicho at Pharmacy insurance  will not cover additional 2 mg hydromorphone but will cover 4 mg  RX changed for 4 mg to take 1/2-1 every 4 hours max 6 day. 30 given  Thanks Nahed Olson P-BC     Detail Level: Simple Comment: Primarily macular ecchymoses somewhat improved from prior visit (intermediate purpura on upper back have resolved) after starting topical arnica and avoiding further NSAID use. \\n\\nLikely exacerbated by trauma from scratching underlying pruritus (see below) although she is scratching much less now. Patient likely susceptible due to daily NSAID use (for treatment of arthritis) and sun damaged skin - she reports that she is off NSAID now, no longer taking aspirin either. \\n\\nNo other signs of scurvy, no history of Anna-Danlos or platelet function defect (eg Von Willebrand disease). Recommend checking labs as below to investigate for underlying thrombocytopenia, hepatic insufficiency, prolonged INR (vitamin K deficiency), amyloidosis - patient forgot to have labs checked after last visit, script given again today. Comment: Patient with pruritus isolated to extremities and upper back - she reports 80% improvement from prior visit after starting oral N-acetylcysteine. No underlying primary dermatitis or obvious xerosis. Patient prefers to remain at current dose rather than increasing N-acetylcysteine to TID. Recommend starting anti-itch cream as well. \\n\\nDiscussed possible drug-induced cause as patient is on several medications that have been implicated in causing pruritus (oxycodone, ibuprofen, omeprazole, verapamil > fluoxetine, meloxicam > atorvastatin, gabapentin, lisinopril), also recently completed course of terbinafine - but given improvement on current regimen, will hold off on speaking with PCP to try to discontinue any non-essential medications +/- systemic lab work-up to investigate for any underlying cause. Comment: Patient reports that it has bled in the past, states that she may want to RTC for removal in future if occurs again. Declined removal today.

## 2019-09-27 ENCOUNTER — MYC REFILL (OUTPATIENT)
Dept: FAMILY MEDICINE | Facility: CLINIC | Age: 39
End: 2019-09-27

## 2019-09-27 DIAGNOSIS — M54.2 CERVICALGIA: ICD-10-CM

## 2019-09-27 DIAGNOSIS — G47.01 INSOMNIA DUE TO MEDICAL CONDITION: ICD-10-CM

## 2019-09-27 RX ORDER — METHOCARBAMOL 500 MG/1
500 TABLET, FILM COATED ORAL AT BEDTIME
Qty: 90 TABLET | Refills: 3 | Status: SHIPPED | OUTPATIENT
Start: 2019-09-27 | End: 2019-11-26

## 2019-09-27 RX ORDER — TRAZODONE HYDROCHLORIDE 100 MG/1
200 TABLET ORAL AT BEDTIME
Qty: 180 TABLET | Refills: 1 | Status: SHIPPED | OUTPATIENT
Start: 2019-09-27

## 2019-10-03 DIAGNOSIS — M54.2 CERVICALGIA: ICD-10-CM

## 2019-10-03 RX ORDER — METHOCARBAMOL 500 MG/1
TABLET, FILM COATED ORAL
Qty: 180 TABLET | Refills: 0 | OUTPATIENT
Start: 2019-10-03

## 2019-10-03 NOTE — TELEPHONE ENCOUNTER
Requested Prescriptions   Pending Prescriptions Disp Refills     methocarbamol (ROBAXIN) 500 MG tablet [Pharmacy Med Name: METHOCARBAMOL 500MG TABLETS] 180 tablet 0     Sig: TAKE 2 TABLETS(1000 MG) BY MOUTH THREE TIMES DAILY AS NEEDED FOR MUSCLE SPASMS       There is no refill protocol information for this order        Look like this is a dupilcation from 9/27/19

## 2019-10-03 NOTE — TELEPHONE ENCOUNTER
Duplicate. Do she  need this RX again ?  Please call Shweta to discuss.   Thanks Nahed Olson P-BC

## 2019-11-04 ENCOUNTER — HEALTH MAINTENANCE LETTER (OUTPATIENT)
Age: 39
End: 2019-11-04

## 2019-11-25 ENCOUNTER — TELEPHONE (OUTPATIENT)
Dept: FAMILY MEDICINE | Facility: CLINIC | Age: 39
End: 2019-11-25

## 2019-11-25 DIAGNOSIS — M54.2 CERVICALGIA: ICD-10-CM

## 2019-11-25 NOTE — TELEPHONE ENCOUNTER
Reason for Call:   prescription    Detailed comments: Pharmacy is contacting us questioning methocarbamol (ROBAXIN) 500 MG tablet dosing.  Patient is stating she takes 2 at bedtime but last prescription is written for 1 at bedtime.  There are previous prescriptions for 2 tablets 3 times per day.  Please clarify what patient should be taking.    Phone Number Patient can be reached at: Home number on file [phone] (home)    Pharmacy: [facility] [place].  P - [phone]  F - [phone]    Best Time: Any    Can we leave a detailed message on this number? YES    Call taken on 11/25/2019 at 11:12 AM by Alyx Alanis

## 2019-11-26 RX ORDER — METHOCARBAMOL 500 MG/1
1000 TABLET, FILM COATED ORAL AT BEDTIME
Qty: 180 TABLET | Refills: 1 | Status: SHIPPED | OUTPATIENT
Start: 2019-11-26

## 2020-11-22 ENCOUNTER — HEALTH MAINTENANCE LETTER (OUTPATIENT)
Age: 40
End: 2020-11-22

## 2021-09-19 ENCOUNTER — HEALTH MAINTENANCE LETTER (OUTPATIENT)
Age: 41
End: 2021-09-19

## 2022-01-08 ENCOUNTER — HEALTH MAINTENANCE LETTER (OUTPATIENT)
Age: 42
End: 2022-01-08

## 2022-04-02 NOTE — TELEPHONE ENCOUNTER
Pending Prescriptions:                       Disp   Refills    methocarbamol (ROBAXIN) 500 MG tablet     180 ta*1            Sig: Take 2 tablets (1,000 mg) by mouth 3 times daily           as needed for muscle spasms    traZODone (DESYREL) 100 MG tablet         180 ta*1            Sig: Take 2 tablets (200 mg) by mouth At Bedtime    requesting med refills   [facility]    02-Apr-2022 17:48

## 2022-11-20 ENCOUNTER — HEALTH MAINTENANCE LETTER (OUTPATIENT)
Age: 42
End: 2022-11-20

## 2023-01-05 NOTE — MR AVS SNAPSHOT
After Visit Summary   10/16/2017    Shweta Pike    MRN: [id number]           Patient Information     Date Of Birth          1980        Visit Information        Provider Department      10/16/2017 9:10 AM Quincy Hines MD [facility]        Today's Diagnoses     Strain of right shoulder, sequela    -  1       Follow-ups after your visit        Your next 10 appointments already scheduled     Oct 18, 2017  1:00 PM CDT   Ortho Treatment with Saloni Finn PT   [facility] ([facility])    [address] [place] [id number] [phone]              Who to contact     Please call your clinic at [phone] to:    Ask questions about your health    Make or cancel appointments    Discuss your medicines    Learn about your test results    Speak to your doctor   If you have compliments or concerns about an experience at your clinic, or if you wish to file a complaint, please contact [facility] Patient Relations at [phone] or email us at [email or web address]         Additional Information About Your Visit        MyChart Information     [email or web address] gives you secure access to your electronic health record. If you see a primary care provider, you can also send messages to your care team and make appointments. If you have questions, please call your primary care clinic.  If you do not have a primary care provider, please call [phone] and they will assist you.      [employer] is an electronic gateway that provides easy, online access to your medical records. With [employer], you can request a clinic appointment, read your test results, renew a prescription or communicate with your care team.     To access your existing account, please contact your [facility] or call [phone] for assistance.        Care EveryWhere ID     This is your Care EveryWhere ID.  "This could be used by other organizations to access your [facility] medical records  [id number]        Your Vitals Were     Height BMI (Body Mass Index)                1.626 m (5' 4\") 27.64 kg/m2           Blood Pressure from Last 3 Encounters:   08/02/17 102/62   07/17/17 114/78   07/06/17 126/80    Weight from Last 3 Encounters:   10/16/17 73 kg (161 lb)   08/28/17 71.5 kg (157 lb 11.2 oz)   08/02/17 72.6 kg (160 lb)              Today, you had the following     No orders found for display       Primary Care Provider Office Phone # Fax #    Nahed Olson, ORQUIDEA [facility] [phone]       [address] [place] [address]        Equal Access to Services     YOVANNY ALEJANDRO : Grey delgado Soruiz, waaxda luqadaha, qaybta kaalmada adeegyada, kaden easton . So [facility] [phone].    ATENCIÓN: Si habla español, tiene a zamudio disposición servicios gratuitos de asistencia lingüística. Llame al [phone].    We comply with applicable federal civil rights laws and [place] laws. We do not discriminate on the basis of race, color, national origin, age, disability, sex, sexual orientation, or gender identity.            Thank you!     Thank you for choosing [facility]  for your care. Our goal is always to provide you with excellent care. Hearing back from our patients is one way we can continue to improve our services. Please take a few minutes to complete the written survey that you may receive in the mail after your visit with us. Thank you!             Your Updated Medication List - Protect others around you: Learn how to safely use, store and throw away your medicines at www.disposemymeds.org.          This list is accurate as of: 10/16/17  7:59 PM.  Always use your most recent med list.                   Brand Name Dispense Instructions for use Diagnosis    * HYDROcodone-acetaminophen 5-325 MG per tablet    NORCO    30 tablet    Take 1 tablet by mouth " every 6 hours as needed for moderate to severe pain    Pain in joint of right shoulder       * HYDROcodone-acetaminophen 5-325 MG per tablet    NORCO    20 tablet    Take 1 tablet by mouth every 6 hours as needed for moderate to severe pain    Right shoulder strain, initial encounter       ibuprofen 800 MG tablet    ADVIL/MOTRIN    90 tablet    Take 1 tablet (800 mg) by mouth 3 times daily (with meals)    Pain in joint of right shoulder       methocarbamol 500 MG tablet    ROBAXIN    90 tablet    Take 2 tablets (1,000 mg) by mouth 3 times daily as needed for muscle spasms    Cervicalgia       propranolol 20 MG tablet    INDERAL    15 tablet    Take 1 tablet (20 mg) by mouth once as needed Take 60 minutes before testing. Can adjust up to 60 mg if HR and BP tolerating    Test anxiety       * Notice:  This list has 2 medication(s) that are the same as other medications prescribed for you. Read the directions carefully, and ask your doctor or other care provider to review them with you.       Fellow

## 2023-04-15 ENCOUNTER — HEALTH MAINTENANCE LETTER (OUTPATIENT)
Age: 43
End: 2023-04-15

## 2024-02-03 ENCOUNTER — HEALTH MAINTENANCE LETTER (OUTPATIENT)
Age: 44
End: 2024-02-03

## (undated) DEVICE — LINEN ORTHO PACK 5446

## (undated) DEVICE — ABLATOR ARTHREX APOLLO RF MP90 ASPIRATING 90DEG AR-9811

## (undated) DEVICE — IMM KIT SHOULDER STABILIZATION 7210573

## (undated) DEVICE — SYR 10ML FINGER CONTROL W/O NDL 309695

## (undated) DEVICE — DRAPE STERI U 1015

## (undated) DEVICE — DRSG ABDOMINAL 07 1/2X8" 7197D

## (undated) DEVICE — SU MONOCRYL 3-0 PS-1 27" Y936H

## (undated) DEVICE — TUBING SYSTEM ARTHREX PATIENT REDEUCE AR-6421

## (undated) DEVICE — GLOVE PROTEXIS POWDER FREE SMT 8.5 2D72PT85X

## (undated) DEVICE — IMM KIT SHOULDER TMAX MASK FACE 7210559

## (undated) DEVICE — SOL WATER IRRIG 1000ML BOTTLE 2F7114

## (undated) DEVICE — ARTHROSCOPIC CANNULA TWIST-IN PURPLE 7MMX7CM AR-6570

## (undated) DEVICE — DRSG STERI STRIP 1/2X4" R1547

## (undated) DEVICE — DEVICE PASSER LASSO 25DEG CVD LT AR-6068-25TL

## (undated) DEVICE — SUCTION MANIFOLD NEPTUNE 2 SYS 4 PORT 0702-020-000

## (undated) DEVICE — GLOVE PROTEXIS POWDER FREE 8.5 ORTHOPEDIC 2D73ET85

## (undated) DEVICE — DRSG GAUZE 4X8"

## (undated) DEVICE — DRSG ADAPTIC 3X8" 6113

## (undated) DEVICE — DRAPE MAYO STAND 23X54 8337

## (undated) DEVICE — NDL ECLIPSE 18GA 1.5"

## (undated) DEVICE — DRAPE U-POUCH 34X29" 1067

## (undated) DEVICE — PACK OPEN SHOULDER CUSTOM ASC

## (undated) RX ORDER — CLINDAMYCIN PHOSPHATE 900 MG/50ML
INJECTION, SOLUTION INTRAVENOUS
Status: DISPENSED
Start: 2017-07-17

## (undated) RX ORDER — FENTANYL CITRATE 50 UG/ML
INJECTION, SOLUTION INTRAMUSCULAR; INTRAVENOUS
Status: DISPENSED
Start: 2017-07-17

## (undated) RX ORDER — GABAPENTIN 300 MG/1
CAPSULE ORAL
Status: DISPENSED
Start: 2017-07-17

## (undated) RX ORDER — PROPOFOL 10 MG/ML
INJECTION, EMULSION INTRAVENOUS
Status: DISPENSED
Start: 2017-07-17

## (undated) RX ORDER — ONDANSETRON 2 MG/ML
INJECTION INTRAMUSCULAR; INTRAVENOUS
Status: DISPENSED
Start: 2017-07-17

## (undated) RX ORDER — ACETAMINOPHEN 325 MG/1
TABLET ORAL
Status: DISPENSED
Start: 2017-07-17

## (undated) RX ORDER — DEXAMETHASONE SODIUM PHOSPHATE 4 MG/ML
INJECTION, SOLUTION INTRA-ARTICULAR; INTRALESIONAL; INTRAMUSCULAR; INTRAVENOUS; SOFT TISSUE
Status: DISPENSED
Start: 2017-07-17